# Patient Record
Sex: FEMALE
[De-identification: names, ages, dates, MRNs, and addresses within clinical notes are randomized per-mention and may not be internally consistent; named-entity substitution may affect disease eponyms.]

---

## 2017-07-10 ENCOUNTER — HOSPITAL ENCOUNTER (INPATIENT)
Dept: HOSPITAL 14 - H.ER | Age: 82
LOS: 5 days | Discharge: HOME HEALTH | DRG: 444 | End: 2017-07-15
Attending: INTERNAL MEDICINE | Admitting: INTERNAL MEDICINE
Payer: MEDICARE

## 2017-07-10 VITALS — BODY MASS INDEX: 25.8 KG/M2

## 2017-07-10 DIAGNOSIS — K82.8: ICD-10-CM

## 2017-07-10 DIAGNOSIS — K29.70: ICD-10-CM

## 2017-07-10 DIAGNOSIS — K59.00: ICD-10-CM

## 2017-07-10 DIAGNOSIS — M51.26: ICD-10-CM

## 2017-07-10 DIAGNOSIS — Z87.891: ICD-10-CM

## 2017-07-10 DIAGNOSIS — K62.5: ICD-10-CM

## 2017-07-10 DIAGNOSIS — I25.10: ICD-10-CM

## 2017-07-10 DIAGNOSIS — I25.2: ICD-10-CM

## 2017-07-10 DIAGNOSIS — I10: ICD-10-CM

## 2017-07-10 DIAGNOSIS — J44.1: ICD-10-CM

## 2017-07-10 DIAGNOSIS — D66: ICD-10-CM

## 2017-07-10 DIAGNOSIS — K80.64: Primary | ICD-10-CM

## 2017-07-10 DIAGNOSIS — Z88.0: ICD-10-CM

## 2017-07-10 DIAGNOSIS — Z87.442: ICD-10-CM

## 2017-07-10 DIAGNOSIS — Z14.01: ICD-10-CM

## 2017-07-10 LAB
ALBUMIN SERPL-MCNC: 4.5 G/DL (ref 3.5–5)
ALBUMIN/GLOB SERPL: 1.2 {RATIO} (ref 1–2.1)
ALT SERPL-CCNC: 33 U/L (ref 9–52)
APTT BLD: 31.5 SECONDS (ref 25.6–37.1)
AST SERPL-CCNC: 27 U/L (ref 14–36)
BACTERIA #/AREA URNS HPF: (no result) /[HPF]
BASOPHILS # BLD AUTO: 0.1 K/UL (ref 0–0.2)
BASOPHILS NFR BLD: 0.9 % (ref 0–2)
BILIRUB UR-MCNC: NEGATIVE MG/DL
BUN SERPL-MCNC: 23 MG/DL (ref 7–17)
CALCIUM SERPL-MCNC: 10.1 MG/DL (ref 8.4–10.2)
CAOX CRY #/AREA URNS HPF: (no result) /HPF
COLOR UR: (no result)
EOSINOPHIL # BLD AUTO: 0.3 K/UL (ref 0–0.7)
EOSINOPHIL NFR BLD: 3.3 % (ref 0–4)
ERYTHROCYTE [DISTWIDTH] IN BLOOD BY AUTOMATED COUNT: 14.1 % (ref 11.5–14.5)
GFR NON-AFRICAN AMERICAN: 39
GLUCOSE UR STRIP-MCNC: (no result) MG/DL
HGB BLD-MCNC: 15.6 G/DL (ref 12–16)
INR PPP: 1.1 (ref 0.9–1.2)
LEUKOCYTE ESTERASE UR-ACNC: (no result) LEU/UL
LYMPHOCYTES # BLD AUTO: 1.4 K/UL (ref 1–4.3)
LYMPHOCYTES NFR BLD AUTO: 14.9 % (ref 20–40)
MCH RBC QN AUTO: 28.6 PG (ref 27–31)
MCHC RBC AUTO-ENTMCNC: 33 G/DL (ref 33–37)
MCV RBC AUTO: 86.6 FL (ref 81–99)
MONOCYTES # BLD: 0.7 K/UL (ref 0–0.8)
MONOCYTES NFR BLD: 7.1 % (ref 0–10)
NEUTROPHILS # BLD: 7.1 K/UL (ref 1.8–7)
NEUTROPHILS NFR BLD AUTO: 73.8 % (ref 50–75)
NRBC BLD AUTO-RTO: 0.1 % (ref 0–0)
PH UR STRIP: 5 [PH] (ref 5–8)
PLATELET # BLD: 228 K/UL (ref 130–400)
PMV BLD AUTO: 8.6 FL (ref 7.2–11.7)
PROT UR STRIP-MCNC: 30 MG/DL
PROTHROMBIN TIME: 12.3 SECONDS (ref 9.8–13.1)
RBC # BLD AUTO: 5.46 MIL/UL (ref 3.8–5.2)
RBC # UR STRIP: NEGATIVE /UL
SP GR UR STRIP: 1.02 (ref 1–1.03)
SQUAMOUS EPITHIAL: 13 /HPF (ref 0–5)
URINE CLARITY: (no result)
URINE HYALINE CAST: >20 /HPF (ref 0–2)
URINE NITRATE: NEGATIVE
UROBILINOGEN UR-MCNC: 2 MG/DL (ref 0.2–1)
WBC # BLD AUTO: 9.7 K/UL (ref 4.8–10.8)

## 2017-07-10 NOTE — ED PDOC
- Laboratory Results


Result Diagrams: 


 07/12/17 07:20





 07/12/17 07:20





- ECG


O2 Sat by Pulse Oximetry: 98 (RA)





Disposition





- Clinical Impression


Clinical Impression: 


 Abdominal pain in female








- POA


Present On Arrival: None





- Disposition


Disposition: Transfer of Care


Disposition Time: 19:00


Condition: STABLE


Patient Signed Over To: Houston Gilbert


Handoff Comments: Pending CT.





Addendum


Addendum: 





07/10/17 17:43





Pt signed out by Dr. Ha pending CT.

## 2017-07-10 NOTE — CT
EXAM:

  CT Abdomen and Pelvis Without Intravenous Contrast



CLINICAL HISTORY:

  84 years old, female; Pain; Abdominal pain; Flank; Right upper quadrant 

(ruq); Additional info: Rlq pain, bloody stool; ? HX appendectomy 40+ yrs



TECHNIQUE:

  Axial computed tomography images of the abdomen and pelvis without 

intravenous contrast.  This CT exam was performed using one or more of the 

following dose reduction techniques:  automated exposure control, adjustment of 

the mA and/or kV according to patient size, and/or use of iterative 

reconstruction technique.

  Coronal and sagittal reformatted images were created and reviewed.



EXAM DATE/TIME:

  7/10/2017 5:40 PM



COMPARISON:

  CT - ABD   PELVIS PO CONTRAST ONLY 11/19/2016 3:18:32 PM



FINDINGS:

  Artifacts:  Motion artifact degrades image quality.

  Lower thorax:  The heart is mildly enlarged. There coronary calcifications. 

There is a small hiatal hernia. There is atelectasis and scarring at the lung 

bases.



 ABDOMEN:

  Liver: There is a small cyst in the liver.

  Gallbladder and bile ducts:  Gallbladder is distended. Common bile duct is 

dilated 12 mm in diameter.. There are multiple stones in the common duct.

  Pancreas:  Pancreas is atrophic.

  Spleen:  unremarkable

  Adrenals:  Right adrenal is nodular. Left adrenal is unremarkable.

  Kidneys and ureters:  There is right renal scarring. There are dystrophic 

calcifications in the right kidney. There is less extensive left renal 

scarring. There is left renal cyst.There is no pelvocaliectasis or 

ureterectasis.

  Stomach and bowel:  Stomach is almost empty. Rotation is normal. There is no 

obstruction. Terminal ileum is unremarkable.Appendix is not visualized. There 

is moderate stool in the colon. Streak and motion limited evaluation of the 

colon.

  Appendix:  See above.



 PELVIS:

  Bladder:  unremarkable

  Reproductive:  Uterus and adnexal structures are unremarkable.



 ABDOMEN and PELVIS:

  Intraperitoneal space:  There is no free air or free fluid.

  Bones/joints:  There are degenerative changes in the osseus structures.

  Soft tissues:  There is a very small fat containing umbilical hernia.

  Vasculature:  There are vascular calcifications.

  Lymph nodes:  There is no pathologic adenopathy.



IMPRESSION:  Distended gallbladder with dilated common duct and 

choledocholithiasis, similar finding seen on the prior study; no acute solid 

visceral abnormality; small hepatic cyst, unchanged; right renal scarring, 

complex right midpole renal mass seen on the prior study is no longer present, 

pelvocaliectasis and ureterectasis seen on the prior study has resolved; no 

acute bowel abnormality



Additional findings as described above.

## 2017-07-10 NOTE — ED PDOC
HPI: Abdomen


Time Seen by Provider: 07/10/17 15:48


Chief Complaint (Nursing): Abdominal Pain


Chief Complaint (Provider): Abdominal Pain


History Per: Patient


History/Exam Limitations: no limitations


Onset/Duration Of Symptoms: Days (x3 days)


Current Symptoms Are (Timing): Still Present


Additional Complaint(s): 





85 y/o female presents to the emergency department with a complaint of a right-

sided abdominal pain with bloody stools (had resolved since) x3 days. Pain is 

persistent and associated with generalized weakness and nausea. Describes pain 

as sharp. Denies radiation of pain elsewhere or any urinary symptoms. 





Past Medical History


Reviewed: Historical Data, Nursing Documentation, Vital Signs


Vital Signs: 


 Last Vital Signs











Temp  98.2 F   07/15/17 16:46


 


Pulse  80   07/15/17 16:46


 


Resp  17   07/15/17 16:46


 


BP  149/77   07/15/17 16:46


 


Pulse Ox  96   07/15/17 16:46














- Medical History


PMH: COPD, HTN, Kidney Stones, Pneumonia





- Surgical History


Other surgeries: Ovarian cyst surgery removed





- Family History


Family History: States: Unknown Family Hx





- Home Medications


Home Medications: 


 Ambulatory Orders











 Medication  Instructions  Recorded


 


ALPRAZolam [Xanax] 1 mg PO DAILY 07/10/17


 


Diltiazem HCl [Cardizem] 60 mg PO QID 07/10/17


 


Dorzolamide HCl 1 drop OS BID 07/10/17


 


Latanoprost 0.005% Opht [Xalatan 1 drop OU HS 07/10/17





Opht]  


 


Metoprolol Tartrate 100 mg PO BID 07/10/17


 


Timolol [Betimol] 1 drop OS BID 07/10/17


 


oxyCODONE/Acetaminophen [Percocet 1 tab PO PRN PRN 07/10/17





5/325 mg Tab]  


 


Ciprofloxacin [Cipro] 500 mg PO Q12 #10 tab 07/14/17














- Allergies


Allergies/Adverse Reactions: 


 Allergies











Allergy/AdvReac Type Severity Reaction Status Date / Time


 


Penicillins Allergy  RASH Verified 11/19/16 11:42














Review of Systems


ROS Statement: Except As Marked, All Systems Reviewed And Found Negative


Constitutional: Positive for: Weakness (Generalized)


Gastrointestinal: Positive for: Nausea, Abdominal Pain (Right-sided), 

Hematochezia (Had resolved since)


Genitourinary Female: Negative for: Dysuria, Frequency, Incontinence, Hematuria





Physical Exam





- Reviewed


Nursing Documentation Reviewed: Yes


Vital Signs Reviewed: Yes





- Physical Exam


Appears: Positive for: Non-toxic, No Acute Distress (Appears elderly and mildly 

dehydrated)


Head Exam: Positive for: ATRAUMATIC, NORMAL INSPECTION, NORMOCEPHALIC


Skin: Positive for: Normal Color, Warm, Dry


Neck: Positive for: Normal, Supple


Cardiovascular/Chest: Positive for: Regular Rate, Rhythm.  Negative for: Murmur


Respiratory: Positive for: Normal Breath Sounds.  Negative for: Accessory 

Muscle Use, Respiratory Distress


Gastrointestinal/Abdominal: Positive for: Soft, Tenderness (RLQ abdominal 

tenderness).  Negative for: Normal Exam


Extremity: Positive for: Normal ROM.  Negative for: Pedal Edema


Neurologic/Psych: Positive for: Alert, Oriented





- Laboratory Results


Result Diagrams: 


 07/15/17 13:00





 07/15/17 13:00





- ECG


O2 Sat by Pulse Oximetry: 98 (RA)


Pulse Ox Interpretation: Normal





Medical Decision Making


Medical Decision Making: 





Time: 1559


Initial impression: Abdominal pain


Initial plan:


--Abdominal pelvis PO & IV Contrast CT 


--Iohexol 50 ml PO 


--Urinalysis 


-- labwork





Time: 1706


--Sodium Chloride 1,000 ml  mls/hr





Results reviewed and discussed w PMD for admission.





Scribe Attestation:


Documented by Vania Rico, acting as a scribe for Adrian Ha MD.





Provider Scribe Attestation:


All medical record entries made by the Scribe were at my direction and 

personally dictated by me. I have reviewed the chart and agree that the record 

accurately reflects my personal performance of the history, physical exam, 

medical decision making, and the department course for this patient. I have 

also personally directed, reviewed, and agree with the discharge instructions 

and disposition.





Disposition





- Clinical Impression


Clinical Impression: 


 Abdominal pain in female, Cholelithiases, Rectal bleeding








- Patient ED Disposition


Is Patient to be Admitted: Yes


Counseled Patient/Family Regarding: Studies Performed, Diagnosis





- Disposition


Disposition Time: 17:30


Condition: STABLE





- Pt Status Changed To:


Hospital Disposition Of: Inpatient





- Admit Certification


Admit to Inpatient:: After my assessment, the patient will require 

hospitalization for at least two midnights.  This is because of the severity of 

symptoms shown, intensity of services needed, and/or the medical risk in this 

patient being treated as an outpatient.





- POA


Present On Arrival: None

## 2017-07-10 NOTE — ED PDOC
- Laboratory Results


Result Diagrams: 


 07/10/17 16:16





 07/10/17 16:16





- ECG


O2 Sat by Pulse Oximetry: 98 (RA)





- CT Scan/US


  ** CT abdomen and pelvis


Other Rad Studies (CT/US): Read By Radiologist, Radiology Report Reviewed (see 

MDM for interpretation)





Medical Decision Making


Medical Decision Makin:00


Patient is signed out to me by Melissa Vargas MD pending CT abdomen and pelvis 

with PO contrast, reevaluation, and final disposition.





20:13


CT abdomen and pelvis read and reviewed by radiologist.


FINDINGS:


Artifacts: Motion artifact degrades image quality.


Lower thorax: The heart is mildly enlarged. There coronary calcifications. 

There is a small hiatal


hernia. There is atelectasis and scarring at the lung bases.


ABDOMEN:


Liver: There is a small cyst in the liver.


Gallbladder and bile ducts: Gallbladder is distended. Common bile duct is 

dilated 12 mm in


diameter.. There are multiple stones in the common duct.


Pancreas: Pancreas is atrophic.


Spleen: unremarkable


Adrenals: Right adrenal is nodular. Left adrenal is unremarkable.


Kidneys and ureters: There is right renal scarring. There are dystrophic 

calcifications in the right


kidney. There is less extensive left renal scarring. There is left renal 

cyst.There is no pelvocaliectasis


or ureterectasis.


Stomach and bowel: Stomach is almost empty. Rotation is normal. There is no 

obstruction. Terminal


ileum is unremarkable.Appendix is not visualized. There is moderate stool in 

the colon. Streak and


motion limited evaluation of the colon.


Appendix: See above.


PELVIS:


Bladder: unremarkable


Reproductive: Uterus and adnexal structures are unremarkable.


ABDOMEN and PELVIS:


Intraperitoneal space: There is no free air or free fluid.


Bones/joints: There are degenerative changes in the osseus structures.


Soft tissues: There is a very small fat containing umbilical hernia.


Vasculature: There are vascular calcifications.


Lymph nodes: There is no pathologic adenopathy.


IMPRESSION: Distended gallbladder with dilated common duct and 

choledocholithiasis, similar


finding seen on the prior study; no acute solid visceral abnormality; small 

hepatic cyst, unchanged;


right renal scarring, complex right midpole renal mass seen on the prior study 

is no longer present,


pelvocaliectasis and ureterectasis seen on the prior study has resolved; no 

acute bowel abnormality





20:45


Upon provider reevaluation, patient has persistent pain. Given CT findings of 

multiple gallstones in the common bile duct, patient will be admitted for 

further treatment and consult with gastroenterology specialist. 


Discussed case with . Patient will be admitted to med/surg under 

.





--------------------------------------------------------------------------------

----------------- 


Scribe Attestation:


Documented by Alicia Velasco, acting as a scribe for Houston Gilbert MD.


 


Provider Scribe Attestation:


All medical record entries made by the Scribe were at my direction and 

personally dictated by me. I have reviewed the chart and agree that the record 

accurately reflects my personal performance of the history, physical exam, 

medical decision making, and the department course for this patient. I have 

also personally directed, reviewed, and agree with the discharge instructions 

and disposition.








Disposition


Counseled Patient/Family Regarding: Studies Performed, Diagnosis





- Clinical Impression


Clinical Impression: 


 Choledocholithiasis, Abdominal pain in female








- POA


Present On Arrival: None





- Disposition


Disposition: Admitted as In-Patient


Disposition Time: 20:45


Condition: STABLE

## 2017-07-11 LAB
BASOPHILS # BLD AUTO: 0.1 K/UL (ref 0–0.2)
BASOPHILS NFR BLD: 1 % (ref 0–2)
BUN SERPL-MCNC: 16 MG/DL (ref 7–17)
CALCIUM SERPL-MCNC: 9.1 MG/DL (ref 8.4–10.2)
EOSINOPHIL # BLD AUTO: 0.4 K/UL (ref 0–0.7)
EOSINOPHIL NFR BLD: 4.3 % (ref 0–4)
ERYTHROCYTE [DISTWIDTH] IN BLOOD BY AUTOMATED COUNT: 13.9 % (ref 11.5–14.5)
GFR NON-AFRICAN AMERICAN: > 60
HGB BLD-MCNC: 14.5 G/DL (ref 12–16)
LYMPHOCYTES # BLD AUTO: 1.7 K/UL (ref 1–4.3)
LYMPHOCYTES NFR BLD AUTO: 21.4 % (ref 20–40)
MCH RBC QN AUTO: 28.3 PG (ref 27–31)
MCHC RBC AUTO-ENTMCNC: 32.8 G/DL (ref 33–37)
MCV RBC AUTO: 86.4 FL (ref 81–99)
MONOCYTES # BLD: 0.6 K/UL (ref 0–0.8)
MONOCYTES NFR BLD: 7.5 % (ref 0–10)
NEUTROPHILS # BLD: 5.4 K/UL (ref 1.8–7)
NEUTROPHILS NFR BLD AUTO: 65.8 % (ref 50–75)
NRBC BLD AUTO-RTO: 0 % (ref 0–0)
PLATELET # BLD: 189 K/UL (ref 130–400)
PMV BLD AUTO: 9 FL (ref 7.2–11.7)
RBC # BLD AUTO: 5.13 MIL/UL (ref 3.8–5.2)
WBC # BLD AUTO: 8.2 K/UL (ref 4.8–10.8)

## 2017-07-11 RX ADMIN — DORZOLAMIDE HYDROCHLORIDE SCH UNITS: 20 SOLUTION OPHTHALMIC at 17:27

## 2017-07-11 RX ADMIN — LATANOPROST SCH DROP: 50 SOLUTION OPHTHALMIC at 22:59

## 2017-07-11 RX ADMIN — TIMOLOL MALEATE SCH DROP: 2.5 SOLUTION OPHTHALMIC at 17:27

## 2017-07-11 RX ADMIN — TIMOLOL MALEATE SCH DROP: 2.5 SOLUTION OPHTHALMIC at 12:54

## 2017-07-11 RX ADMIN — DORZOLAMIDE HYDROCHLORIDE SCH UNITS: 20 SOLUTION OPHTHALMIC at 11:16

## 2017-07-11 NOTE — CP.PCM.CON
History of Present Illness





- History of Present Illness


History of Present Illness: 





Surgery consult for Dr. Gill





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 

MI presents with R side abd pain and rectal bleeding. 


She reports that pain started 3 days ago and progressively gotten worse. Pain 

is located RLQ and RUQ radiating to epigastric area and L side. This is the 

second time having these episodes. She also had an episode of rectal bleeding 

which resolved. Pain is not associated with food. C/O weakness and nausea. 

Denies F/C/V/D/CP/SOB. 


For her hemophilia traits, she is normally asymptomatic, but after a surgery 

she has had excessive bleeding, and has needed factor VIII transfusions. Pt had 

Colonoscopy 10 yrs ago and was normal. 


Pt had a CT scan which showed several gall stones  with inflammatory changes in 

the GB . WBC and LFT are wnl. She has not had a test to check the factor VIII 

level in 12 yrs. She has no evidence of bleeding from any site other than the 

rectum for 2 days.





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


PSH: dental extraction, L breast cyst removal, 





Review of Systems





- Review of Systems


Review of Systems: 





See HPI 





Past Patient History





- Past Medical History & Family History


Past Medical History?: Yes





- Past Social History


Smoking Status: Former Smoker





- CARDIAC


Hx Cardiac Disorders: Yes


Hx Heart Attack: Yes (1986)


Hx Hypertension: Yes





- PULMONARY


Hx Chronic Obstructive Pulmonary Disease (COPD): Yes


Hx Pneumonia: Yes





- NEUROLOGICAL


Hx Neurological Disorder: Yes


Hx Dizziness: Yes (LIGHTHEADEDNESS)





- HEENT


Hx Cataracts: Yes





- RENAL


Hx Kidney Stones: Yes





- ENDOCRINE/METABOLIC


Hx Endocrine Disorders: No





- HEMATOLOGICAL/ONCOLOGICAL


Hx Hemophilia: Yes (Carrier)





- MUSCULOSKELETAL/RHEUMATOLOGICAL


Hx Falls: No





- GASTROINTESTINAL


Hx Gall Bladder Disease: Yes


Hx Hemorrhoids: Yes


Other/Comment: Rectal bleeding





- GENITOURINARY/GYNECOLOGICAL


Hx Hematuria: Yes





- PSYCHIATRIC


Hx Substance Use: No





- SURGICAL HISTORY


Hx Cataract Extraction: Yes


Other/Comment: Ovarian cyst





- ANESTHESIA


Hx Anesthesia: Yes


Hx Anesthesia Reactions: No





Meds


Allergies/Adverse Reactions: 


 Allergies











Allergy/AdvReac Type Severity Reaction Status Date / Time


 


Penicillins Allergy  RASH Verified 11/19/16 11:42














- Medications


Medications: 


 Current Medications





Alprazolam (Xanax)  0.5 mg PO TID PRN


   PRN Reason: Anxiety


Diltiazem HCl (Cardizem)  60 mg PO QID [facility]


   Last Admin: 07/11/17 12:52 Dose:  60 mg


Dorzolamide HCl (Trusopt)  1 drop OS BID [facility]


   Last Admin: 07/11/17 11:16 Dose:  1 units


Enoxaparin Sodium (Lovenox)  40 mg SC DAILY [facility]


   PRN Reason: Protocol


   Last Admin: 07/11/17 11:16 Dose:  40 mg


Latanoprost (Xalatan Opht)  1 drop OU HS [facility]


Timolol Maleate (Timoptic 0.25% Ophth Soln)  1 drop OS BID [facility]


   Last Admin: 07/11/17 12:54 Dose:  1 drop











Physical Exam





- Constitutional


Appears: No Acute Distress





- Head Exam


Head Exam: ATRAUMATIC, NORMAL INSPECTION, NORMOCEPHALIC





- Eye Exam


Eye Exam: EOMI, Normal appearance, PERRL


Pupil Exam: NORMAL ACCOMODATION, PERRL





- ENT Exam


ENT Exam: Mucous Membranes Moist, Normal Exam





- Neck Exam


Neck exam: Positive for: Normal Inspection





- Respiratory Exam


Respiratory Exam: Clear to Auscultation Bilateral, NORMAL BREATHING PATTERN





- Cardiovascular Exam


Cardiovascular Exam: REGULAR RHYTHM





- GI/Abdominal Exam


GI & Abdominal Exam: Normal Bowel Sounds, Soft, Tenderness.  absent: Distended, 

Firm, Guarding, Hernia


Additional comments: 





RUQ RLQ TTP 





- Rectal Exam


Rectal Exam: Hemorrhoids.  absent: Black Stool, Bloody Stool


Additional comments: 





Normal sphincter tone. Brown stool in rectal vault. Small external hemorrhoid. 

NT 





-  Exam


 Exam: NORMAL INSPECTION





- Back Exam


Back exam: NORMAL INSPECTION





- Neurological Exam


Neurological exam: Alert, CN II-XII Intact, Normal Gait, Oriented x3, Reflexes 

Normal





- Psychiatric Exam


Psychiatric exam: Normal Affect, Normal Mood





- Skin


Skin Exam: Dry, Intact, Normal Color, Warm





Results





- Vital Signs


Recent Vital Signs: 


 Last Vital Signs











Temp  97.5 F L  07/11/17 12:04


 


Pulse  92 H  07/11/17 12:52


 


Resp  18   07/11/17 12:04


 


BP  137/72   07/11/17 12:52


 


Pulse Ox  99   07/11/17 12:04














- Labs


Result Diagrams: 


 07/11/17 05:00





 07/11/17 05:00


Labs: 


 Laboratory Results - last 24 hr











  07/10/17 07/11/17 07/11/17





  21:05 05:00 05:00


 


WBC   8.2 


 


RBC   5.13 


 


Hgb   14.5 


 


Hct   44.3 


 


MCV   86.4 


 


MCH   28.3 


 


MCHC   32.8 L 


 


RDW   13.9 


 


Plt Count   189 


 


MPV   9.0 


 


Neut % (Auto)   65.8 


 


Lymph % (Auto)   21.4 


 


Mono % (Auto)   7.5 


 


Eos % (Auto)   4.3 H 


 


Baso % (Auto)   1.0 


 


Neut #   5.4 


 


Lymph #   1.7 


 


Mono #   0.6 


 


Eos #   0.4 


 


Baso #   0.1 


 


Sodium    143


 


Potassium    3.8


 


Chloride    110 H


 


Carbon Dioxide    25


 


Anion Gap    12


 


BUN    16


 


Creatinine    0.8


 


Est GFR ( Amer)    > 60


 


Est GFR (Non-Af Amer)    > 60


 


Random Glucose    88


 


Calcium    9.1


 


Lipase  158  














Assessment & Plan





- Assessment and Plan (Free Text)


Assessment: 





Choledocolithiasis 


CT shows gallstones and multiple stones in the CBD. CBD 12mm


WBC , LFT wnl





-GI on board: Possible ERCP 


-trend labs


-Hemeonc on board for hemophilia carrier 


-We will follow closely. 


-May need eventual cholecystectomy : will need to be given factor VIII before 

and after the surgery,She has not had a test to check the factor VIII level in 

12 yrs. 


-NPO for possible ERCP 





DW Dr. Gill

## 2017-07-11 NOTE — CP.PCM.HP
History of Present Illness





- History of Present Illness


History of Present Illness: 





85 y/o w/f admitted with abdominal pain


found to have cholelithiasis/distended gallbladder/choldocholithiasis





The patient also reports that the day prior to admission in the days leading up 

to admission she has been having rectal bleeding.


Have advised her that she most likely needs a colonoscopy


Patient should have a cholecystectomy


At this point she is undecided about either one




















PMH:


COPD


Hypertension


CAD


Old MI 1986


Carrier for Hemophilia


Nephrolithiasis


Gastritis


LS  Multple Herniated discs


Anxiety





Labs: Are basically within normal limits except for a creatinine of 1.3











Present on Admission





- Present on Admission


Any Indicators Present on Admission: No





Review of Systems





- Gastrointestinal


Gastrointestinal: Hematochezia





Past Patient History





- Past Medical History & Family History


Past Medical History?: Yes





- Past Social History


Smoking Status: Former Smoker





- CARDIAC


Hx Cardiac Disorders: Yes


Hx Heart Attack: Yes (1986)


Hx Hypertension: Yes





- PULMONARY


Hx Chronic Obstructive Pulmonary Disease (COPD): Yes


Hx Pneumonia: Yes





- NEUROLOGICAL


Hx Neurological Disorder: Yes


Hx Dizziness: Yes (LIGHTHEADEDNESS)





- HEENT


Hx Cataracts: Yes





- RENAL


Hx Kidney Stones: Yes





- ENDOCRINE/METABOLIC


Hx Endocrine Disorders: No





- HEMATOLOGICAL/ONCOLOGICAL


Hx Hemophilia: Yes (Carrier)





- MUSCULOSKELETAL/RHEUMATOLOGICAL


Hx Falls: No





- GASTROINTESTINAL


Hx Gall Bladder Disease: Yes


Hx Hemorrhoids: Yes


Other/Comment: Rectal bleeding





- GENITOURINARY/GYNECOLOGICAL


Hx Hematuria: Yes





- PSYCHIATRIC


Hx Substance Use: No





- SURGICAL HISTORY


Hx Cataract Extraction: Yes


Other/Comment: Ovarian cyst





- ANESTHESIA


Hx Anesthesia: Yes


Hx Anesthesia Reactions: No





Meds


Allergies/Adverse Reactions: 


 Allergies











Allergy/AdvReac Type Severity Reaction Status Date / Time


 


Penicillins Allergy  RASH Verified 11/19/16 11:42














Physical Exam





- Constitutional


Appears: Well





- Head Exam


Head Exam: NORMAL INSPECTION





- Eye Exam


Eye Exam: Normal appearance





- ENT Exam


ENT Exam: Normal Exam





- Neck Exam


Neck exam: Positive for: Normal Inspection





- Respiratory Exam


Respiratory Exam: NORMAL BREATHING PATTERN





- Cardiovascular Exam


Cardiovascular Exam: REGULAR RHYTHM





- GI/Abdominal Exam


GI & Abdominal Exam: Normal Bowel Sounds





Results





- Vital Signs


Recent Vital Signs: 





 Last Vital Signs











Temp  97.5 F L  07/11/17 04:11


 


Pulse  80   07/11/17 04:11


 


Resp  16   07/11/17 04:11


 


BP  155/82 H  07/11/17 04:11


 


Pulse Ox  98   07/11/17 04:11














- Labs


Result Diagrams: 


 07/11/17 05:00





 07/11/17 05:00


Labs: 





 Laboratory Results - last 24 hr











  07/10/17





  21:05


 


Lipase  158














Assessment & Plan


(1) Choledocholithiasis


Status: Acute   





(2) Cholelithiasis


Status: Acute   Onset Date: 11/19/16   





(3) Rectal bleeding


Status: Acute   





(4) Essential (primary) hypertension


Status: Acute   





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 

exposure to volcanic air pollution


Status: Acute   





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


Status: Acute   





(7) Herniated lumbar intervertebral disc


Status: Acute

## 2017-07-12 LAB
ALBUMIN SERPL-MCNC: 3.7 G/DL (ref 3.5–5)
ALBUMIN SERPL-MCNC: 3.8 G/DL (ref 3.5–5)
ALBUMIN/GLOB SERPL: 1.3 {RATIO} (ref 1–2.1)
ALBUMIN/GLOB SERPL: 1.3 {RATIO} (ref 1–2.1)
ALT SERPL-CCNC: 25 U/L (ref 9–52)
ALT SERPL-CCNC: 30 U/L (ref 9–52)
AST SERPL-CCNC: 20 U/L (ref 14–36)
AST SERPL-CCNC: 22 U/L (ref 14–36)
BILIRUBIN,DIRECT: 0.4 MG/ML (ref 0–0.4)
BUN SERPL-MCNC: 19 MG/DL (ref 7–17)
CALCIUM SERPL-MCNC: 9 MG/DL (ref 8.4–10.2)
ERYTHROCYTE [DISTWIDTH] IN BLOOD BY AUTOMATED COUNT: 13.8 % (ref 11.5–14.5)
GFR NON-AFRICAN AMERICAN: > 60
HGB BLD-MCNC: 14.5 G/DL (ref 12–16)
MCH RBC QN AUTO: 28.4 PG (ref 27–31)
MCHC RBC AUTO-ENTMCNC: 33.2 G/DL (ref 33–37)
MCV RBC AUTO: 85.7 FL (ref 81–99)
PLATELET # BLD: 201 K/UL (ref 130–400)
RBC # BLD AUTO: 5.11 MIL/UL (ref 3.8–5.2)
WBC # BLD AUTO: 7.4 K/UL (ref 4.8–10.8)

## 2017-07-12 RX ADMIN — TIMOLOL MALEATE SCH DROP: 2.5 SOLUTION OPHTHALMIC at 09:52

## 2017-07-12 RX ADMIN — DORZOLAMIDE HYDROCHLORIDE SCH DROP: 20 SOLUTION OPHTHALMIC at 17:00

## 2017-07-12 RX ADMIN — LATANOPROST SCH DROP: 50 SOLUTION OPHTHALMIC at 21:42

## 2017-07-12 RX ADMIN — DORZOLAMIDE HYDROCHLORIDE SCH DROP: 20 SOLUTION OPHTHALMIC at 09:53

## 2017-07-12 RX ADMIN — TIMOLOL MALEATE SCH DROP: 2.5 SOLUTION OPHTHALMIC at 17:39

## 2017-07-12 NOTE — CP.PCM.PN
Subjective





- Date & Time of Evaluation


Date of Evaluation: 07/12/17


Time of Evaluation: 11:00





- Subjective


Subjective: 





patient is resting comfortably in bed.


Denies any pains in her abdomen nauseousness or vomiting


Patient is scheduled for ERCP later this afternoon.


Discussed the possibility of a colonoscopy with DR Nelson





Objective





- Vital Signs/Intake and Output


Vital Signs (last 24 hours): 


 











Temp Pulse Resp BP Pulse Ox


 


 98.6 F   90   20   155/90 H  95 


 


 07/12/17 09:00  07/12/17 09:00  07/12/17 09:00  07/12/17 09:00  07/12/17 09:00











- Medications


Medications: 


 Current Medications





Alprazolam (Xanax)  0.5 mg PO TID PRN


   PRN Reason: Anxiety


Diltiazem HCl (Cardizem)  60 mg PO QID [facility]


   Last Admin: 07/12/17 09:52 Dose:  Not Given


Dorzolamide HCl (Trusopt)  1 drop OS BID [facility]


   Last Admin: 07/12/17 09:53 Dose:  1 drop


Enoxaparin Sodium (Lovenox)  40 mg SC DAILY [facility]


   PRN Reason: Protocol


   Last Admin: 07/11/17 11:16 Dose:  40 mg


Latanoprost (Xalatan Opht)  1 drop OU HS [facility]


   Last Admin: 07/11/17 22:59 Dose:  1 drop


Timolol Maleate (Timoptic 0.25% Ophth Soln)  1 drop OS BID [facility]


   Last Admin: 07/12/17 09:52 Dose:  1 drop











- Labs


Labs: 


 





 07/12/17 07:20 





 07/12/17 07:20 





 











PT  12.3 Seconds (9.8-13.1)   07/10/17  16:16    


 


INR  1.1  (0.9-1.2)   07/10/17  16:16    


 


APTT  31.5 Seconds (25.6-37.1)   07/10/17  16:16    














Assessment and Plan


(1) Choledocholithiasis


Status: Acute   





(2) Cholelithiasis


Status: Acute   





(3) Rectal bleeding


Status: Acute   





(4) Essential (primary) hypertension


Status: Acute   





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 

exposure to volcanic air pollution


Status: Acute   





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


Status: Acute   





(7) Herniated lumbar intervertebral disc


Status: Acute

## 2017-07-12 NOTE — CP.PCM.PN
Subjective





- Date & Time of Evaluation


Date of Evaluation: 07/12/17


Time of Evaluation: 13:20





- Subjective


Subjective: 





no pain, feels better after laxative





Objective





- Vital Signs/Intake and Output


Vital Signs (last 24 hours): 


 











Temp Pulse Resp BP Pulse Ox


 


 98.6 F   90   20   155/90 H  95 


 


 07/12/17 09:00  07/12/17 09:00  07/12/17 09:00  07/12/17 09:00  07/12/17 09:00











- Medications


Medications: 


 Current Medications





Alprazolam (Xanax)  0.5 mg PO TID PRN


   PRN Reason: Anxiety


Diltiazem HCl (Cardizem)  60 mg PO QID [facility]


   Last Admin: 07/12/17 09:52 Dose:  Not Given


Dorzolamide HCl (Trusopt)  1 drop OS BID [facility]


   Last Admin: 07/12/17 09:53 Dose:  1 drop


Enoxaparin Sodium (Lovenox)  40 mg SC DAILY [facility]


   PRN Reason: Protocol


   Last Admin: 07/11/17 11:16 Dose:  40 mg


Latanoprost (Xalatan Opht)  1 drop OU HS [facility]


   Last Admin: 07/11/17 22:59 Dose:  1 drop


Timolol Maleate (Timoptic 0.25% Ophth Soln)  1 drop OS BID [facility]


   Last Admin: 07/12/17 09:52 Dose:  1 drop











- Labs


Labs: 


 





 07/12/17 07:20 





 07/12/17 07:20 





 











PT  12.3 Seconds (9.8-13.1)   07/10/17  16:16    


 


INR  1.1  (0.9-1.2)   07/10/17  16:16    


 


APTT  31.5 Seconds (25.6-37.1)   07/10/17  16:16    














- GI/Abdominal Exam


GI & Abdominal Exam: Soft, Normal Bowel Sounds





Assessment and Plan





- Assessment and Plan (Free Text)


Assessment: 





83 yo female with constipation and choledocholithiasis





will d/w team regarding plan


mrcp for now

## 2017-07-12 NOTE — MRI
PROCEDURE:  Magnetic Resonance Cholangiopancreatography



HISTORY:

CBD stone 



COMPARISON:

Comparison is made to the previous CT dated 07/10/2017.



TECHNIQUE:

Multiplanar, multisequence MR images of the abdomen were obtained, 

including heavily T2 weighted MRCP images of the biliary system. 

Rotating maximum intensity projection images of the biliary system 

were generated.



FINDINGS:



MRCP:

There are multiple round filling defects in the common hepatic and 

common biliary duct consistent with choledocholithiasis. Multiple 

choledocholithiasis are seen from the proximal portion of the hepatic 

up to the distal CBD adjacent to the sphincter. The CBD is mildly 

dilated. Mild intrahepatic biliary ductal dilatation is also noted. 



LIVER:

No evidence of suspicious mass in the liver in this noncontrast study.



GALLBLADDER:

The gall bladder is a mildly to moderately distended contains small 

amount of sludge and possible small gallstones.



SPLEEN:

Unremarkable.



PANCREAS:

The pancreas is small in size. No evidence of main pancreatic ductal 

dilatation.  No MRI evidence of mass lesion or acute pathology in the 

pancreas



ADRENALS:

Stable mildly hyperintense T2 signal 1.2 centimeter nodule at the 

right adrenal gland since the previous CT dated 2/2009. Findings 

suggestive of benign adenoma.



KIDNEYS:

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 No 

evidence of hydronephrosis.  Multiple cystic lesions in both kidneys 

are again seen.



AORTA:

No aneurysm.



ASCITES:

None.



OTHER FINDINGS:

None.



IMPRESSION:

Multiple choledocholithiasis seen in the common hepatic and common 

biliary ducts extending from the proximal portion of the common 

hepatic/ main hepatic duct to the distal CBD and associated with mild 

dilatation of the intrahepatic and extrahepatic biliary tree.



Mildly to moderately distended gallbladder contains small amount of 

sludge and small gallstones without evidence of acute cholecystitis.

## 2017-07-12 NOTE — CP.PCM.PN
Subjective





- Date & Time of Evaluation


Date of Evaluation: 07/12/17


Time of Evaluation: 10:06





- Subjective


Subjective: 





Surgery: Dr. Park





Pt seen and examined. Resting comfortably in bed. Pain improved. No F/C. No N/V.





Objective





- Vital Signs/Intake and Output


Vital Signs (last 24 hours): 


 











Temp Pulse Resp BP Pulse Ox


 


 98.6 F   90   20   155/90 H  95 


 


 07/12/17 09:00  07/12/17 09:00  07/12/17 09:00  07/12/17 09:00  07/12/17 09:00











- Medications


Medications: 


 Current Medications





Alprazolam (Xanax)  0.5 mg PO TID PRN


   PRN Reason: Anxiety


Diltiazem HCl (Cardizem)  [facility]ID [facility]


   Last Admin: 07/12/17 09:52 Dose:  Not Given


Dorzolamide HCl (Trusopt)  1 drop OS [facility]ID [facility]


   Last Admin: 07/12/17 09:53 Dose:  1 drop


Enoxaparin Sodium (Lovenox)  40 mg SC [facility]ILY [facility]


   PRN Reason: Protocol


   Last Admin: 07/11/17 11:16 Dose:  40 mg


Latanoprost (Xalatan Opht)  1 drop OU HS [facility]


   Last Admin: 07/11/17 22:59 Dose:  1 drop


Timolol Maleate (Timoptic 0.25% Ophth Soln)  1 drop OS BID [facility]


   Last Admin: 07/12/17 09:52 Dose:  1 drop











- Labs


Labs: 


 





 07/12/17 07:20 





 07/12/17 07:20 





 











PT  12.3 Seconds (9.8-13.1)   07/10/17  16:16    


 


INR  1.1  (0.9-1.2)   07/10/17  16:16    


 


APTT  31.5 Seconds (25.6-37.1)   07/10/17  16:16    














- Constitutional


Appears: Non-toxic, No Acute Distress





- Head Exam


Head Exam: ATRAUMATIC, NORMOCEPHALIC





- Eye Exam


Eye Exam: EOMI.  absent: Scleral icterus





- ENT Exam


ENT Exam: Mucous Membranes Moist





- Neck Exam


Neck Exam: Full ROM





- Respiratory Exam


Respiratory Exam: NORMAL BREATHING PATTERN.  absent: Accessory Muscle Use, 

Respiratory Distress





- GI/Abdominal Exam


GI & Abdominal Exam: Soft.  absent: Distended, Firm, Guarding, Rigid, Tenderness

, Rebound





- Neurological Exam


Neurological Exam: Alert, Awake, Oriented x3





Assessment and Plan





- Assessment and Plan (Free Text)


Assessment: 





84F w. choledocholithiasis


-symptoms improved, LFTs/CBC WNL


-F/U GI recommendations, will likely need ERCP


-c/w current medical management


-will continue to follow


-d/w attending





Julianaitis PGY3

## 2017-07-13 RX ADMIN — TIMOLOL MALEATE SCH DROP: 2.5 SOLUTION OPHTHALMIC at 08:59

## 2017-07-13 RX ADMIN — DORZOLAMIDE HYDROCHLORIDE SCH DROP: 20 SOLUTION OPHTHALMIC at 16:54

## 2017-07-13 RX ADMIN — TIMOLOL MALEATE SCH DROP: 2.5 SOLUTION OPHTHALMIC at 16:54

## 2017-07-13 RX ADMIN — DORZOLAMIDE HYDROCHLORIDE SCH DROP: 20 SOLUTION OPHTHALMIC at 08:59

## 2017-07-13 RX ADMIN — LATANOPROST SCH DROP: 50 SOLUTION OPHTHALMIC at 21:45

## 2017-07-13 NOTE — CP.PCM.PN
Subjective





- Date & Time of Evaluation


Date of Evaluation: 07/13/17


Time of Evaluation: 08:16





- Subjective


Subjective: 





Pt has no more c/o abdominal pain. A MRCP done showed choledocjholethiasis. No 

mass If pt needs any procedure I will have to order factor VIII a day prior to 

the procedure





Objective





- Vital Signs/Intake and Output


Vital Signs (last 24 hours): 


 











Temp Pulse Resp BP Pulse Ox


 


 97.7 F   80   18   146/87   97 


 


 07/13/17 07:51  07/13/17 07:51  07/13/17 07:51  07/13/17 07:51  07/13/17 07:51











- Medications


Medications: 


 Current Medications





Alprazolam (Xanax)  0.5 mg PO TID PRN


   PRN Reason: Anxiety


   Last Admin: 07/12/17 21:42 Dose:  0.5 mg


Diltiazem HCl (Cardizem)  60 mg PO QID [facility]


   Last Admin: 07/12/17 21:42 Dose:  60 mg


Dorzolamide HCl (Trusopt)  1 drop OS BID [facility]


   Last Admin: 07/12/17 17:00 Dose:  1 drop


Enoxaparin Sodium (Lovenox)  40 mg SC DAILY [facility]


   PRN Reason: Protocol


   Last Admin: 07/11/17 11:16 Dose:  40 mg


Latanoprost (Xalatan Opht)  1 drop OU HS [facility]


   Last Admin: 07/12/17 21:42 Dose:  1 drop


Timolol Maleate (Timoptic 0.25% Ophth Soln)  1 drop OS BID [facility]


   Last Admin: 07/12/17 17:39 Dose:  1 drop











- Labs


Labs: 


 





 07/12/17 07:20 





 07/12/17 07:20 





 











PT  12.3 Seconds (9.8-13.1)   07/10/17  16:16    


 


INR  1.1  (0.9-1.2)   07/10/17  16:16    


 


APTT  31.5 Seconds (25.6-37.1)   07/10/17  16:16

## 2017-07-13 NOTE — CP.PCM.PN
<ArturGisell - Last Filed: 07/13/17 09:59>





Subjective





- Date & Time of Evaluation


Date of Evaluation: 07/13/17


Time of Evaluation: 10:00





- Subjective


Subjective: 





Surgery for Dr. Gill





Pt s&eBalaji COATS. Pt tolerating diet. pain improved. ambulating. Voiding. BM. Pt 

reluctant to undergo procedures. 





Objective





- Vital Signs/Intake and Output


Vital Signs (last 24 hours): 


 











Temp Pulse Resp BP Pulse Ox


 


 97.7 F   80   18   146/87   97 


 


 07/13/17 07:51  07/13/17 08:58  07/13/17 07:51  07/13/17 08:58  07/13/17 07:51











- Medications


Medications: 


 Current Medications





Alprazolam (Xanax)  0.5 mg PO TID PRN


   PRN Reason: Anxiety


   Last Admin: 07/12/17 21:42 Dose:  0.5 mg


Diltiazem HCl (Cardizem)  60 mg PO QID [facility]


   Last Admin: 07/13/17 08:58 Dose:  60 mg


Dorzolamide HCl (Trusopt)  1 drop OS BID [facility]


   Last Admin: 07/13/17 08:59 Dose:  1 drop


Enoxaparin Sodium (Lovenox)  40 mg SC DAILY [facility]


   PRN Reason: Protocol


   Last Admin: 07/11/17 11:16 Dose:  40 mg


Latanoprost (Xalatan Opht)  1 drop OU HS [facility]


   Last Admin: 07/12/17 21:42 Dose:  1 drop


Timolol Maleate (Timoptic 0.25% Ophth Soln)  1 drop OS BID [facility]


   Last Admin: 07/13/17 08:59 Dose:  1 drop











- Labs


Labs: 


 





 07/12/17 07:20 





 07/12/17 07:20 





 











PT  12.3 Seconds (9.8-13.1)   07/10/17  16:16    


 


INR  1.1  (0.9-1.2)   07/10/17  16:16    


 


APTT  31.5 Seconds (25.6-37.1)   07/10/17  16:16    














- Constitutional


Appears: No Acute Distress





- Head Exam


Head Exam: ATRAUMATIC, NORMAL INSPECTION, NORMOCEPHALIC





- Eye Exam


Eye Exam: EOMI, Normal appearance, PERRL


Pupil Exam: NORMAL ACCOMODATION, PERRL





- ENT Exam


ENT Exam: Mucous Membranes Moist, Normal Exam





- Neck Exam


Neck Exam: Full ROM, Normal Inspection.  absent: Lymphadenopathy





- Respiratory Exam


Respiratory Exam: Clear to Ausculation Bilateral, NORMAL BREATHING PATTERN





- Cardiovascular Exam


Cardiovascular Exam: REGULAR RHYTHM, +S1, +S2.  absent: Murmur





- GI/Abdominal Exam


GI & Abdominal Exam: Soft, Tenderness, Normal Bowel Sounds.  absent: Distended, 

Firm, Guarding, Rigid


Additional comments: 





RUQ TTP 





-  Exam


 Exam: NORMAL INSPECTION





- Extremities Exam


Extremities Exam: Full ROM, Normal Capillary Refill, Normal Inspection.  absent

: Joint Swelling, Pedal Edema





- Back Exam


Back Exam: NORMAL INSPECTION





- Neurological Exam


Neurological Exam: Alert, Awake, CN II-XII Intact, Normal Gait, Oriented x3





- Psychiatric Exam


Psychiatric exam: Normal Affect, Normal Mood





- Skin


Skin Exam: Dry, Intact, Normal Color, Warm





Assessment and Plan





- Assessment and Plan (Free Text)


Assessment: 





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


MRCP/CT : choledocolithiasis 


LFT : wnl 


Pt and family reluctant to undergo procedures for bleeding risks and age





-Diet per GI and primary 


-symptoms improved, LFTs/CBC WNL


-F/U GI recommendations


-c/w current medical management


-will continue to follow


-d/w attending





<Matthew Gill - Last Filed: 07/13/17 10:50>





Subjective





- Subjective


Subjective: 





Patient was seen and examined at the bedside.   Agree with resident's note 

above.





Objective





- Vital Signs/Intake and Output


Vital Signs (last 24 hours): 


 











Temp Pulse Resp BP Pulse Ox


 


 97.7 F   80   18   146/87   97 


 


 07/13/17 07:51  07/13/17 08:58  07/13/17 07:51  07/13/17 08:58  07/13/17 07:51











- Medications


Medications: 


 Current Medications





Alprazolam (Xanax)  0.5 mg PO TID PRN


   PRN Reason: Anxiety


   Last Admin: 07/12/17 21:42 Dose:  0.5 mg


Diltiazem HCl (Cardizem)  60 mg PO QID [facility]


   Last Admin: 07/13/17 08:58 Dose:  60 mg


Dorzolamide HCl (Trusopt)  1 drop OS BID [facility]


   Last Admin: 07/13/17 08:59 Dose:  1 drop


Enoxaparin Sodium (Lovenox)  40 mg SC DAILY [facility]


   PRN Reason: Protocol


   Last Admin: 07/11/17 11:16 Dose:  40 mg


Latanoprost (Xalatan Opht)  1 drop OU HS [facility]


   Last Admin: 07/12/17 21:42 Dose:  1 drop


Timolol Maleate (Timoptic 0.25% Ophth Soln)  1 drop OS BID [facility]


   Last Admin: 07/13/17 08:59 Dose:  1 drop











- Labs


Labs: 


 





 07/12/17 07:20 





 07/12/17 07:20 





 











PT  12.3 Seconds (9.8-13.1)   07/10/17  16:16    


 


INR  1.1  (0.9-1.2)   07/10/17  16:16    


 


APTT  31.5 Seconds (25.6-37.1)   07/10/17  16:16

## 2017-07-13 NOTE — CP.PCM.PN
Subjective





- Date & Time of Evaluation


Date of Evaluation: 07/13/17


Time of Evaluation: 17:40





- Subjective


Subjective: 





doing well





Objective





- Vital Signs/Intake and Output


Vital Signs (last 24 hours): 


 











Temp Pulse Resp BP Pulse Ox


 


 98.4 F   76   18   146/76   94 L


 


 07/13/17 15:43  07/13/17 16:55  07/13/17 15:43  07/13/17 16:55  07/13/17 15:43











- Medications


Medications: 


 Current Medications





Alprazolam (Xanax)  0.5 mg PO TID PRN


   PRN Reason: Anxiety


   Last Admin: 07/12/17 21:42 Dose:  0.5 mg


Diltiazem HCl (Cardizem)  [facility]ID [facility]


   Last Admin: 07/13/17 16:55 Dose:  60 mg


Dorzolamide HCl (Trusopt)  1 drop OS BID [facility]


   Last Admin: 07/13/17 16:54 Dose:  1 drop


Ciprofloxacin (Cipro 400mg/200ml Dsw)  400 mg in 200 mls @ 200 mls/hr IVPB ON 

CALL ONE


   Stop: 07/14/17 10:59


Indomethacin (Indocin Suppository)  100 mg WA ONCE ONE


   Stop: 07/14/17 10:01


Latanoprost (Xalatan Opht)  1 drop OU HS [facility]


   Last Admin: 07/12/17 21:42 Dose:  1 drop


Timolol Maleate (Timoptic 0.25% Ophth Soln)  1 drop OS BID [facility]


   Last Admin: 07/13/17 16:54 Dose:  1 drop











- Labs


Labs: 


 





 07/12/17 07:20 





 07/12/17 07:20 





 











PT  12.3 Seconds (9.8-13.1)   07/10/17  16:16    


 


INR  1.1  (0.9-1.2)   07/10/17  16:16    


 


APTT  31.5 Seconds (25.6-37.1)   07/10/17  16:16    














- GI/Abdominal Exam


GI & Abdominal Exam: Soft, Normal Bowel Sounds





Assessment and Plan





- Assessment and Plan (Free Text)


Assessment: 





83 yo female with choledocholithiasis





ercp in am

## 2017-07-14 LAB
ALBUMIN SERPL-MCNC: 4.3 G/DL (ref 3.5–5)
ALBUMIN/GLOB SERPL: 1.2 {RATIO} (ref 1–2.1)
ALT SERPL-CCNC: 44 U/L (ref 9–52)
AMYLASE SERPL-CCNC: 125 U/L (ref 30–110)
AST SERPL-CCNC: 36 U/L (ref 14–36)
BASOPHILS # BLD AUTO: 0.1 K/UL (ref 0–0.2)
BASOPHILS NFR BLD: 1 % (ref 0–2)
BUN SERPL-MCNC: 18 MG/DL (ref 7–17)
CALCIUM SERPL-MCNC: 9.4 MG/DL (ref 8.4–10.2)
EOSINOPHIL # BLD AUTO: 0.3 K/UL (ref 0–0.7)
EOSINOPHIL NFR BLD: 4.1 % (ref 0–4)
ERYTHROCYTE [DISTWIDTH] IN BLOOD BY AUTOMATED COUNT: 13.8 % (ref 11.5–14.5)
ERYTHROCYTE [DISTWIDTH] IN BLOOD BY AUTOMATED COUNT: 13.9 % (ref 11.5–14.5)
GFR NON-AFRICAN AMERICAN: > 60
HGB BLD-MCNC: 14.3 G/DL (ref 12–16)
HGB BLD-MCNC: 15.5 G/DL (ref 12–16)
LIPASE SERPL-CCNC: 686 U/L (ref 23–300)
LYMPHOCYTES # BLD AUTO: 1.4 K/UL (ref 1–4.3)
LYMPHOCYTES NFR BLD AUTO: 17.1 % (ref 20–40)
MCH RBC QN AUTO: 28.2 PG (ref 27–31)
MCH RBC QN AUTO: 28.9 PG (ref 27–31)
MCHC RBC AUTO-ENTMCNC: 32.4 G/DL (ref 33–37)
MCHC RBC AUTO-ENTMCNC: 33.4 G/DL (ref 33–37)
MCV RBC AUTO: 86.6 FL (ref 81–99)
MCV RBC AUTO: 87 FL (ref 81–99)
MONOCYTES # BLD: 0.6 K/UL (ref 0–0.8)
MONOCYTES NFR BLD: 6.9 % (ref 0–10)
NEUTROPHILS # BLD: 5.9 K/UL (ref 1.8–7)
NEUTROPHILS NFR BLD AUTO: 70.9 % (ref 50–75)
NRBC BLD AUTO-RTO: 0.1 % (ref 0–0)
PLATELET # BLD: 170 K/UL (ref 130–400)
PLATELET # BLD: 198 K/UL (ref 130–400)
PMV BLD AUTO: 8.6 FL (ref 7.2–11.7)
RBC # BLD AUTO: 5.08 MIL/UL (ref 3.8–5.2)
RBC # BLD AUTO: 5.34 MIL/UL (ref 3.8–5.2)
WBC # BLD AUTO: 12.7 K/UL (ref 4.8–10.8)
WBC # BLD AUTO: 8.3 K/UL (ref 4.8–10.8)

## 2017-07-14 PROCEDURE — 0F798ZZ DILATION OF COMMON BILE DUCT, VIA NATURAL OR ARTIFICIAL OPENING ENDOSCOPIC: ICD-10-PCS | Performed by: INTERNAL MEDICINE

## 2017-07-14 PROCEDURE — BF10YZZ FLUOROSCOPY OF BILE DUCTS USING OTHER CONTRAST: ICD-10-PCS | Performed by: INTERNAL MEDICINE

## 2017-07-14 RX ADMIN — TIMOLOL MALEATE SCH DROP: 2.5 SOLUTION OPHTHALMIC at 09:02

## 2017-07-14 RX ADMIN — CIPROFLOXACIN SCH MLS/HR: 2 INJECTION, SOLUTION INTRAVENOUS at 21:14

## 2017-07-14 RX ADMIN — DORZOLAMIDE HYDROCHLORIDE SCH DROP: 20 SOLUTION OPHTHALMIC at 09:02

## 2017-07-14 RX ADMIN — LATANOPROST SCH DROP: 50 SOLUTION OPHTHALMIC at 21:23

## 2017-07-14 RX ADMIN — TIMOLOL MALEATE SCH DROP: 2.5 SOLUTION OPHTHALMIC at 16:34

## 2017-07-14 RX ADMIN — DORZOLAMIDE HYDROCHLORIDE SCH DROP: 20 SOLUTION OPHTHALMIC at 16:34

## 2017-07-14 NOTE — CP.PCM.PN
Subjective





- Date & Time of Evaluation


Date of Evaluation: 07/14/17


Time of Evaluation: 08:49





- Subjective


Subjective: 





Pt is going to have a ERCP today. She will need to be transfused with factor 

VIII, prior to nsurgery. I spojke to Leonila,(head of pharmacy) who will have the 

product available by 10 am today. if there is bleeding post procedure, more 

will be available, but I dont feel pt will need it.





Objective





- Vital Signs/Intake and Output


Vital Signs (last 24 hours): 


 











Temp Pulse Resp BP Pulse Ox


 


 97.5 F L  77   18   160/85 H  96 


 


 07/14/17 07:40  07/14/17 07:40  07/14/17 07:40  07/14/17 07:40  07/14/17 07:40











- Medications


Medications: 


 Current Medications





Alprazolam (Xanax)  0.5 mg PO TID PRN


   PRN Reason: Anxiety


   Last Admin: 07/13/17 22:24 Dose:  0.5 mg


Diltiazem HCl (Cardizem)  60 mg PO QID [facility]


   Last Admin: 07/13/17 21:42 Dose:  60 mg


Dorzolamide HCl (Trusopt)  1 drop OS BID [facility]


   Last Admin: 07/13/17 16:54 Dose:  1 drop


Ciprofloxacin (Cipro 400mg/200ml Dsw)  400 mg in 200 mls @ 200 mls/hr IVPB ON 

CALL ONE


   Stop: 07/14/17 10:59


Indomethacin (Indocin Suppository)  100 mg [place] ONCE ONE


   Stop: 07/14/17 10:01


Latanoprost (Xalatan Opht)  1 drop OU HS [facility]


   Last Admin: 07/13/17 21:45 Dose:  1 drop


Timolol Maleate (Timoptic 0.25% Ophth Soln)  1 drop OS BID [facility]


   Last Admin: 07/13/17 16:54 Dose:  1 drop











- Labs


Labs: 


 





 07/12/17 07:20 





 07/12/17 07:20 





 











PT  12.3 Seconds (9.8-13.1)   07/10/17  16:16    


 


INR  1.1  (0.9-1.2)   07/10/17  16:16    


 


APTT  31.5 Seconds (25.6-37.1)   07/10/17  16:16

## 2017-07-14 NOTE — CP.PCM.PN
Subjective





- Date & Time of Evaluation


Date of Evaluation: 07/14/17


Time of Evaluation: 09:00





- Subjective


Subjective: 





The patient is resting quietly in bed.


She is scheduled for ERCP today


Dr ADDIE Randhawa will be giving the patient  factor VIII





The patient is refusing to have a lap cholecystectomy


If the stones are not removed today











Objective





- Vital Signs/Intake and Output


Vital Signs (last 24 hours): 


 











Temp Pulse Resp BP Pulse Ox


 


 97.5 F L  77   18   160/85 H  96 


 


 07/14/17 07:40  07/14/17 07:40  07/14/17 07:40  07/14/17 07:40  07/14/17 07:40











- Medications


Medications: 


 Current Medications





Alprazolam (Xanax)  0.5 mg PO TID PRN


   PRN Reason: Anxiety


   Last Admin: 07/13/17 22:24 Dose:  0.5 mg


Diltiazem HCl (Cardizem)  60 mg PO QID [facility]


   Last Admin: 07/14/17 09:01 Dose:  Not Given


Dorzolamide HCl (Trusopt)  1 drop OS BID [facility]


   Last Admin: 07/14/17 09:02 Dose:  1 drop


Ciprofloxacin (Cipro 400mg/200ml Dsw)  400 mg in 200 mls @ 200 mls/hr IVPB ON 

CALL ONE


   Stop: 07/14/17 10:59


   Last Admin: 07/14/17 09:02 Dose:  200 mls/hr


Latanoprost (Xalatan Opht)  1 drop OU HS [facility]


   Last Admin: 07/13/17 21:45 Dose:  1 drop


Timolol Maleate (Timoptic 0.25% Ophth Soln)  1 drop OS BID [facility]


   Last Admin: 07/14/17 09:02 Dose:  1 drop











- Labs


Labs: 


 





 07/14/17 08:40 





 07/14/17 08:40 





 











PT  12.3 Seconds (9.8-13.1)   07/10/17  16:16    


 


INR  1.1  (0.9-1.2)   07/10/17  16:16    


 


APTT  31.5 Seconds (25.6-37.1)   07/10/17  16:16    














Assessment and Plan








(2) Cholelithiasis


Status: Acute   





(3) Rectal bleeding


Status: Acute   





(4) Essential (primary) hypertension


Status: Acute   





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 

exposure to volcanic air pollution


Status: Acute   





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


Status: Acute   





(7) Herniated lumbar intervertebral disc


Status: Acute

## 2017-07-14 NOTE — CP.PCM.PN
Subjective





- Date & Time of Evaluation


Date of Evaluation: 07/14/17


Time of Evaluation: 15:27





- Subjective


Subjective: 





ERCP done multiple stones removed


pt can be d/c ed in AM





Objective





- Vital Signs/Intake and Output


Vital Signs (last 24 hours): 


 











Temp Pulse Resp BP Pulse Ox


 


 97 F L  73   13   144/80   100 


 


 07/14/17 13:36  07/14/17 13:36  07/14/17 13:36  07/14/17 13:36  07/14/17 13:36








Intake and Output: 


 











 07/14/17 07/14/17





 06:59 18:59


 


Intake Total  950


 


Balance  950














- Medications


Medications: 


 Current Medications





Alprazolam (Xanax)  0.5 mg PO TID PRN


   PRN Reason: Anxiety


   Last Admin: 07/13/17 22:24 Dose:  0.5 mg


Diltiazem HCl (Cardizem)  60 mg PO QID [facility]


   Last Admin: 07/14/17 13:19 Dose:  Not Given


Dorzolamide HCl (Trusopt)  1 drop OS BID [facility]


   Last Admin: 07/14/17 09:02 Dose:  1 drop


Hydromorphone HCl (Dilaudid)  0.5 mg IVP Q6 PRN


   PRN Reason: Pain, moderate (4-7)


Ciprofloxacin (Cipro 400mg/200ml Dsw)  400 mg in 200 mls @ 200 mls/hr IVPB Q12 

ANITA


Lactated Ringer's (Lactated Ringer's)  1,000 mls @ 100 mls/hr IV .Q10H ANITA


Latanoprost (Xalatan Opht)  1 drop OU HS [facility]


   Last Admin: 07/13/17 21:45 Dose:  1 drop


Timolol Maleate (Timoptic 0.25% Ophth Soln)  1 drop OS BID [facility]


   Last Admin: 07/14/17 09:02 Dose:  1 drop











- Labs


Labs: 


 





 07/14/17 08:40 





 07/14/17 08:40 





 











PT  12.3 Seconds (9.8-13.1)   07/10/17  16:16    


 


INR  1.1  (0.9-1.2)   07/10/17  16:16    


 


APTT  31.5 Seconds (25.6-37.1)   07/10/17  16:16    














Assessment and Plan








(2) Cholelithiasis


Status: Acute   





(3) Rectal bleeding


Status: Acute   





(4) Essential (primary) hypertension


Status: Acute   





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 

exposure to volcanic air pollution


Status: Acute   





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


Status: Acute   





(7) Herniated lumbar intervertebral disc


Status: Acute

## 2017-07-14 NOTE — CP.PCM.PN
<ArturGisell - Last Filed: 07/14/17 08:05>





Subjective





- Date & Time of Evaluation


Date of Evaluation: 07/14/17


Time of Evaluation: 08:05





- Subjective


Subjective: 





Surgery for Dr. Gill 





Pt s&eBalaji COATS. NPO for ERCP today. Denies F/C/N/V/D/Cp/SOB. +void. 





Objective





- Vital Signs/Intake and Output


Vital Signs (last 24 hours): 


 











Temp Pulse Resp BP Pulse Ox


 


 97.5 F L  77   18   160/85 H  96 


 


 07/14/17 07:40  07/14/17 07:40  07/14/17 07:40  07/14/17 07:40  07/14/17 07:40











- Medications


Medications: 


 Current Medications





Alprazolam (Xanax)  0.5 mg PO TID PRN


   PRN Reason: Anxiety


   Last Admin: 07/13/17 22:24 Dose:  0.5 mg


Diltiazem HCl (Cardizem)  60 mg PO QID [facility]


   Last Admin: 07/13/17 21:42 Dose:  60 mg


Dorzolamide HCl (Trusopt)  1 drop OS BID [facility]


   Last Admin: 07/13/17 16:54 Dose:  1 drop


Ciprofloxacin (Cipro 400mg/200ml Dsw)  400 mg in 200 mls @ 200 mls/hr IVPB ON 

CALL ONE


   Stop: 07/14/17 10:59


Indomethacin (Indocin Suppository)  100 mg OK ONCE ONE


   Stop: 07/14/17 10:01


Latanoprost (Xalatan Opht)  1 drop OU HS [facility]


   Last Admin: 07/13/17 21:45 Dose:  1 drop


Timolol Maleate (Timoptic 0.25% Ophth Soln)  1 drop OS BID [facility]


   Last Admin: 07/13/17 16:54 Dose:  1 drop











- Labs


Labs: 


 





 07/12/17 07:20 





 07/12/17 07:20 





 











PT  12.3 Seconds (9.8-13.1)   07/10/17  16:16    


 


INR  1.1  (0.9-1.2)   07/10/17  16:16    


 


APTT  31.5 Seconds (25.6-37.1)   07/10/17  16:16    














- Constitutional


Appears: No Acute Distress





- Head Exam


Head Exam: ATRAUMATIC, NORMAL INSPECTION, NORMOCEPHALIC





- Eye Exam


Eye Exam: EOMI, Normal appearance, PERRL


Pupil Exam: NORMAL ACCOMODATION, PERRL





- ENT Exam


ENT Exam: Mucous Membranes Moist, Normal Exam





- Neck Exam


Neck Exam: Full ROM, Normal Inspection.  absent: Lymphadenopathy





- Respiratory Exam


Respiratory Exam: Clear to Ausculation Bilateral, NORMAL BREATHING PATTERN





- Cardiovascular Exam


Cardiovascular Exam: REGULAR RHYTHM, +S1, +S2.  absent: Murmur





- GI/Abdominal Exam


GI & Abdominal Exam: Soft, Normal Bowel Sounds.  absent: Distended, Firm, 

Guarding, Rigid, Tenderness





- Extremities Exam


Extremities Exam: Full ROM, Normal Capillary Refill, Normal Inspection.  absent

: Joint Swelling, Pedal Edema





- Back Exam


Back Exam: NORMAL INSPECTION





- Neurological Exam


Neurological Exam: Alert, Awake, CN II-XII Intact, Normal Gait, Oriented x3





- Psychiatric Exam


Psychiatric exam: Normal Affect, Normal Mood





- Skin


Skin Exam: Dry, Intact, Normal Color, Warm.  absent: Erythema





Assessment and Plan





- Assessment and Plan (Free Text)


Assessment: 





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


MRCP/CT : choledocolithiasis 


LFT : wnl 





-F/U ERCP today 


-symptoms improved, LFTs/CBC WNL


-c/w current medical management


-will continue to follow


-d/w attending





<Matthew Gill - Last Filed: 07/14/17 10:38>





Subjective





- Date & Time of Evaluation


Time of Evaluation: 10:00





- Subjective


Subjective: 





Patient was seen and examined at the bedside.  Agree with resident's note above.





Objective





- Vital Signs/Intake and Output


Vital Signs (last 24 hours): 


 











Temp Pulse Resp BP Pulse Ox


 


 97.5 F L  77   18   160/85 H  96 


 


 07/14/17 07:40  07/14/17 07:40  07/14/17 07:40  07/14/17 07:40  07/14/17 07:40











- Medications


Medications: 


 Current Medications





Alprazolam (Xanax)  0.5 mg PO TID PRN


   PRN Reason: Anxiety


   Last Admin: 07/13/17 22:24 Dose:  0.5 mg


Diltiazem HCl (Cardizem)  60 mg PO QID ANITA


   Last Admin: 07/14/17 09:01 Dose:  Not Given


Dorzolamide HCl (Trusopt)  1 drop OS BID ANITA


   Last Admin: 07/14/17 09:02 Dose:  1 drop


Ciprofloxacin (Cipro 400mg/200ml Dsw)  400 mg in 200 mls @ 200 mls/hr IVPB ON 

CALL ONE


   Stop: 07/14/17 10:59


   Last Admin: 07/14/17 09:02 Dose:  200 mls/hr


Latanoprost (Xalatan Opht)  1 drop OU HS ANITA


   Last Admin: 07/13/17 21:45 Dose:  1 drop


Timolol Maleate (Timoptic 0.25% Ophth Soln)  1 drop OS BID ANITA


   Last Admin: 07/14/17 09:02 Dose:  1 drop











- Labs


Labs: 


 





 07/12/17 07:20 





 07/14/17 08:40 





 











PT  12.3 Seconds (9.8-13.1)   07/10/17  16:16    


 


INR  1.1  (0.9-1.2)   07/10/17  16:16    


 


APTT  31.5 Seconds (25.6-37.1)   07/10/17  16:16

## 2017-07-15 VITALS
RESPIRATION RATE: 17 BRPM | TEMPERATURE: 98.2 F | HEART RATE: 80 BPM | DIASTOLIC BLOOD PRESSURE: 77 MMHG | SYSTOLIC BLOOD PRESSURE: 149 MMHG

## 2017-07-15 LAB
ALBUMIN SERPL-MCNC: 3.9 G/DL (ref 3.5–5)
ALBUMIN/GLOB SERPL: 1.2 {RATIO} (ref 1–2.1)
ALT SERPL-CCNC: 28 U/L (ref 9–52)
ANISOCYTOSIS BLD QL SMEAR: SLIGHT
AST SERPL-CCNC: 27 U/L (ref 14–36)
BASOPHILS # BLD AUTO: 0.1 K/UL (ref 0–0.2)
BASOPHILS NFR BLD: 0.6 % (ref 0–2)
BUN SERPL-MCNC: 14 MG/DL (ref 7–17)
CALCIUM SERPL-MCNC: 8.9 MG/DL (ref 8.4–10.2)
EOSINOPHIL # BLD AUTO: 0.1 K/UL (ref 0–0.7)
EOSINOPHIL NFR BLD: 0.6 % (ref 0–4)
ERYTHROCYTE [DISTWIDTH] IN BLOOD BY AUTOMATED COUNT: 14.1 % (ref 11.5–14.5)
GFR NON-AFRICAN AMERICAN: > 60
HGB BLD-MCNC: 14.4 G/DL (ref 12–16)
LIPASE SERPL-CCNC: 285 U/L (ref 23–300)
LYMPHOCYTE: 5 % (ref 20–50)
LYMPHOCYTES # BLD AUTO: 0.7 K/UL (ref 1–4.3)
LYMPHOCYTES NFR BLD AUTO: 5.3 % (ref 20–40)
MCH RBC QN AUTO: 28.3 PG (ref 27–31)
MCHC RBC AUTO-ENTMCNC: 33 G/DL (ref 33–37)
MCV RBC AUTO: 85.9 FL (ref 81–99)
MONOCYTE: 5 % (ref 0–10)
MONOCYTES # BLD: 0.5 K/UL (ref 0–0.8)
MONOCYTES NFR BLD: 4 % (ref 0–10)
NEUTROPHILS # BLD: 12 K/UL (ref 1.8–7)
NEUTROPHILS NFR BLD AUTO: 89.5 % (ref 50–75)
NEUTROPHILS NFR BLD AUTO: 90 % (ref 42–75)
NRBC BLD AUTO-RTO: 0 % (ref 0–0)
OVALOCYTES BLD QL SMEAR: SLIGHT
PLATELET # BLD EST: NORMAL 10*3/UL
PLATELET # BLD: 171 K/UL (ref 130–400)
PMV BLD AUTO: 8.4 FL (ref 7.2–11.7)
RBC # BLD AUTO: 5.08 MIL/UL (ref 3.8–5.2)
TEARDROP CELLS: SLIGHT
TOTAL CELLS COUNTED BLD: 100
WBC # BLD AUTO: 13.4 K/UL (ref 4.8–10.8)

## 2017-07-15 RX ADMIN — CIPROFLOXACIN SCH MLS/HR: 2 INJECTION, SOLUTION INTRAVENOUS at 09:19

## 2017-07-15 RX ADMIN — DORZOLAMIDE HYDROCHLORIDE SCH DROP: 20 SOLUTION OPHTHALMIC at 09:16

## 2017-07-15 RX ADMIN — TIMOLOL MALEATE SCH DROP: 2.5 SOLUTION OPHTHALMIC at 09:16

## 2017-07-15 NOTE — CP.PCM.PN
Subjective





- Date & Time of Evaluation


Date of Evaluation: 07/15/17


Time of Evaluation: 05:45





- Subjective


Subjective: 





General surgery progress note for Dr. Frantz Burgos, PGY-1





Pt S & E at bedside. 





Pt reports "brown spit up" x 3 overnight, did not notify nurse, admits to 

nausea and chills s/p ERCP yesterday, chronic constipation.  Tolerated CLD. 

Denies fevers, SOB, CP, diarrhea, abdominal pain. 





Objective





- Vital Signs/Intake and Output


Vital Signs (last 24 hours): 


 











Temp Pulse Resp BP Pulse Ox


 


 97.7 F   71   19   153/80 H  97 


 


 07/15/17 01:00  07/15/17 01:00  07/15/17 01:00  07/15/17 01:00  07/15/17 01:00











- Medications


Medications: 


 Current Medications





Alprazolam (Xanax)  0.5 mg PO TID PRN


   PRN Reason: Anxiety


   Last Admin: 07/13/17 22:24 Dose:  0.5 mg


Diltiazem HCl (Cardizem)  60 mg PO QID [facility]


   Last Admin: 07/14/17 21:19 Dose:  60 mg


Dorzolamide HCl (Trusopt)  1 drop OS BID [facility]


   Last Admin: 07/14/17 16:34 Dose:  1 drop


Hydromorphone HCl (Dilaudid)  0.5 mg IVP Q6 PRN


   PRN Reason: Pain, moderate (4-7)


   Last Admin: 07/14/17 15:32 Dose:  0.5 mg


Ciprofloxacin (Cipro 400mg/200ml Dsw)  400 mg in 200 mls @ 200 mls/hr IVPB Q12 

ANITA


   Last Admin: 07/14/17 21:14 Dose:  200 mls/hr


Lactated Ringer's (Lactated Ringer's)  1,000 mls @ 125 mls/hr IV .Q8H ANITA


   Last Admin: 07/15/17 05:22 Dose:  125 mls/hr


Latanoprost (Xalatan Opht)  1 drop OU HS ANITA


   Last Admin: 07/14/17 21:23 Dose:  1 drop


Timolol Maleate (Timoptic 0.25% Ophth Soln)  1 drop OS BID [facility]


   Last Admin: 07/14/17 16:34 Dose:  1 drop











- Labs


Labs: 


 





 07/14/17 17:54 





 07/14/17 08:40 





 











PT  12.3 Seconds (9.8-13.1)   07/10/17  16:16    


 


INR  1.1  (0.9-1.2)   07/10/17  16:16    


 


APTT  31.5 Seconds (25.6-37.1)   07/10/17  16:16    














- Constitutional


Appears: Non-toxic, In Acute Distress





- Head Exam


Head Exam: ATRAUMATIC, NORMAL INSPECTION, NORMOCEPHALIC





- Eye Exam


Eye Exam: EOMI, Normal appearance





- ENT Exam


ENT Exam: Mucous Membranes Moist, Normal Exam





- Neck Exam


Neck Exam: Full ROM





- Respiratory Exam


Respiratory Exam: Clear to Ausculation Bilateral, NORMAL BREATHING PATTERN





- Cardiovascular Exam


Cardiovascular Exam: REGULAR RHYTHM, +S1, +S2





- GI/Abdominal Exam


GI & Abdominal Exam: Soft, Normal Bowel Sounds.  absent: Distended, Firm, 

Guarding, Rigid, Tenderness





- Extremities Exam


Extremities Exam: Normal Inspection.  absent: Pedal Edema





- Neurological Exam


Neurological Exam: Alert, Awake, CN II-XII Intact, Oriented x3





- Psychiatric Exam


Psychiatric exam: Normal Affect, Normal Mood





- Skin


Skin Exam: Dry, Intact, Normal Color, Warm





Assessment and Plan





- Assessment and Plan (Free Text)


Assessment: 





84F w/choledocholithiasis s/p ERCP w/removal of multiple stones POD #1


Plan: 





Cont medical mgmt as per primary team


Clinically stable


Will follow


Further recs as per attending





Will PORFIRIO attending





Loretta, PGY1

## 2017-07-19 VITALS — OXYGEN SATURATION: 98 %

## 2017-07-19 NOTE — RAD
PROCEDURE:  ERCP



HISTORY:



COMPARISON:

None



TECHNIQUE:

Standard protocol for this study/examination.



FINDINGS:

 Total fluoroscopic time (continuous mode) utilized during the 

procedure: 6.7 minutes



IMPRESSION:

Less than 1 hr fluoroscopic time utilized during performance of the 

procedure.

## 2017-07-25 NOTE — CON
Dr. Adrian Garcia

[facility]



Chief complaint: Abdominal pain



HPI: This is a 85 y/o female that come into my service. Essentially has a Hx of 
hypertension, COPD, CAD, hemophilia and anxiety who comes in down pain 
discomfort, and complaints of rectal bleeding. Pt is baseline constipated and 
requires laxatives to help with her bowel movement. The pain she was having is 
in the LLQ some _____

No nausea no vomit, no biliary ____. no fever/chills. Past bowel movement was 
two to three days ago. 

Currently lying comfortably and in no apparently distress.

PMhx: as above.

SocHx: as above.



ROS:______negative and positive in HPI pertinent findings



PE:

Vitals:________

Head:______________

Eyes: PERRL, ________no or icterus

Neck: Supple, normal ROM, non appreciative Lumps

Lungs

Heart: S1 S2 with RRR

Abdomen: Soft, Some discomfort in the LLQ___________

Labs:

Radiology have been reviewed



Hct: 44.3 (?)

Platelet count 189

BUN and Creatinine are unremarkable and normal

______are normal 

CAT scan of the abdomen/pelvis ______shows constipation, distended______



Assessment and plan: this is an 85 y/o female____ the pain is most likely 
secondary to the constipation, for which I will give her Magnesia ____ and 
Dulcolax cocktail . _____Many years ago, considering the active bleeding, could 
be elective as well, regarding the coledocolitiasis seen in imaging _____which 
is persistent. cholangitis _____stones could be a cause for pain. However, 
given these findings at some point elective cholecystectomy and elective ERCP 
could be considered, pending family wishes. If that is the case, we need to 
request hematology input regarding hemophilia and risk of bleeding, for now ____
_and will be discussed_____ with family as well, for any further required 
management.

Thank you for the consult, please proceed Dr. Guevara.







________________

Adrian Garcia MDD

## 2018-04-13 ENCOUNTER — HOSPITAL ENCOUNTER (EMERGENCY)
Dept: HOSPITAL 14 - H.ER | Age: 83
Discharge: HOME | End: 2018-04-13
Payer: MEDICARE

## 2018-04-13 VITALS
RESPIRATION RATE: 20 BRPM | HEART RATE: 61 BPM | OXYGEN SATURATION: 94 % | DIASTOLIC BLOOD PRESSURE: 69 MMHG | TEMPERATURE: 97.8 F | SYSTOLIC BLOOD PRESSURE: 116 MMHG

## 2018-04-13 VITALS — BODY MASS INDEX: 25.8 KG/M2

## 2018-04-13 DIAGNOSIS — Z95.0: ICD-10-CM

## 2018-04-13 DIAGNOSIS — R42: Primary | ICD-10-CM

## 2018-04-13 DIAGNOSIS — I48.91: ICD-10-CM

## 2018-04-13 LAB
ALBUMIN SERPL-MCNC: 4.1 G/DL (ref 3.5–5)
ALBUMIN/GLOB SERPL: 1.1 {RATIO} (ref 1–2.1)
ALT SERPL-CCNC: 30 U/L (ref 9–52)
AST SERPL-CCNC: 20 U/L (ref 14–36)
BASOPHILS # BLD AUTO: 0.1 K/UL (ref 0–0.2)
BASOPHILS NFR BLD: 1 % (ref 0–2)
BUN SERPL-MCNC: 19 MG/DL (ref 7–17)
CALCIUM SERPL-MCNC: 9.5 MG/DL (ref 8.4–10.2)
EOSINOPHIL # BLD AUTO: 0.5 K/UL (ref 0–0.7)
EOSINOPHIL NFR BLD: 5.5 % (ref 0–4)
ERYTHROCYTE [DISTWIDTH] IN BLOOD BY AUTOMATED COUNT: 14.4 % (ref 11.5–14.5)
GFR NON-AFRICAN AMERICAN: 43
HGB BLD-MCNC: 15.5 G/DL (ref 12–16)
LYMPHOCYTES # BLD AUTO: 1.7 K/UL (ref 1–4.3)
LYMPHOCYTES NFR BLD AUTO: 18.3 % (ref 20–40)
MCH RBC QN AUTO: 28.2 PG (ref 27–31)
MCHC RBC AUTO-ENTMCNC: 32.6 G/DL (ref 33–37)
MCV RBC AUTO: 86.6 FL (ref 81–99)
MONOCYTES # BLD: 0.7 K/UL (ref 0–0.8)
MONOCYTES NFR BLD: 8.1 % (ref 0–10)
NEUTROPHILS # BLD: 6.2 K/UL (ref 1.8–7)
NEUTROPHILS NFR BLD AUTO: 67.1 % (ref 50–75)
NRBC BLD AUTO-RTO: 0.1 % (ref 0–0)
PLATELET # BLD: 239 K/UL (ref 130–400)
PMV BLD AUTO: 8.7 FL (ref 7.2–11.7)
RBC # BLD AUTO: 5.48 MIL/UL (ref 3.8–5.2)
WBC # BLD AUTO: 9.3 K/UL (ref 4.8–10.8)

## 2018-04-13 NOTE — ED PDOC
Syncope/Near Syncope/Dizziness


Time Seen by Provider: 04/13/18 17:04


Chief Complaint (Nursing): Dizziness/Lightheaded


Chief Complaint (Provider): Dizziness


History Per: Patient


History/Exam Limitations: no limitations


Onset/Duration Of Symptoms: Days


Current Symptoms Are (Timing): Still Present


Additional Complaint(s): 


84yo female with history of COPD, presents to ED with complaints of dizziness 

with associated nausea for the past couple days but worsened today. She denies 

any associated headache, loss of consciousness or focal weaknesses. She also 

denies any chest pain or shortness of breath. She has no other medical 

complaints. 





Past Medical History


Reviewed: Historical Data, Nursing Documentation, Vital Signs


Vital Signs: 





 Last Vital Signs











Temp  97.8 F   04/13/18 17:02


 


Pulse  61   04/13/18 17:02


 


Resp  20   04/13/18 17:02


 


BP  116/69   04/13/18 17:02


 


Pulse Ox  94 L  04/13/18 17:02














- Medical History


PMH: COPD, Gall Bladder Disease, HTN, Kidney Stones, Pneumonia, Chronic Kidney 

Disease





- Surgical History


Surgical History: No Surg Hx





- Family History


Family History: States: Unknown Family Hx





- Home Medications


Home Medications: 


 Ambulatory Orders











 Medication  Instructions  Recorded


 


ALPRAZolam [Xanax] 1 mg PO DAILY 07/10/17


 


Diltiazem HCl [Cardizem] 60 mg PO QID 07/10/17


 


Dorzolamide HCl 1 drop OS BID 07/10/17


 


Latanoprost 0.005% Opht [Xalatan 1 drop OU HS 07/10/17





Opht]  


 


Metoprolol Tartrate 100 mg PO BID 07/10/17


 


Timolol [Betimol] 1 drop OS BID 07/10/17


 


oxyCODONE/Acetaminophen [Percocet 1 tab PO PRN PRN 07/10/17





5/325 mg Tab]  


 


Ciprofloxacin [Cipro] 500 mg PO Q12 #10 tab 07/14/17


 


Meclizine [Meclizine*] 25 mg PO Q8 #15 tab 04/13/18














- Allergies


Allergies/Adverse Reactions: 


 Allergies











Allergy/AdvReac Type Severity Reaction Status Date / Time


 


Penicillins Allergy  RASH Verified 11/19/16 11:42














Review of Systems


ROS Statement: Except As Marked, All Systems Reviewed And Found Negative


Constitutional: Negative for: Fever, Chills


Cardiovascular: Negative for: Chest Pain


Respiratory: Negative for: Shortness of Breath


Gastrointestinal: Positive for: Nausea.  Negative for: Vomiting


Neurological: Positive for: Dizziness.  Negative for: Headache





Physical Exam





- Reviewed


Nursing Documentation Reviewed: Yes


Vital Signs Reviewed: Yes





- Physical Exam


Appears: Positive for: Non-toxic, No Acute Distress


Head Exam: Positive for: ATRAUMATIC, NORMAL INSPECTION, NORMOCEPHALIC


Skin: Positive for: Normal Color, Warm, Dry


Eye Exam: Positive for: Normal appearance, EOMI, PERRL


Neck: Positive for: Normal, Painless ROM, Supple


Cardiovascular/Chest: Positive for: Regular Rate, Rhythm


Respiratory: Positive for: Normal Breath Sounds.  Negative for: Wheezing


Extremity: Positive for: Normal ROM.  Negative for: Deformity, Swelling


Neurologic/Psych: Positive for: Alert, Oriented.  Negative for: Motor/Sensory 

Deficits





- Laboratory Results


Result Diagrams: 


 04/13/18 17:23





 04/13/18 17:23





- ECG


O2 Sat by Pulse Oximetry: 94





Medical Decision Making


Medical Decision Making: 


Impression: Dizziness


Plan:


-- CT Head w/o contrast


-- EKG


-- Chest X-Ray


-- Labs








Scribe Attestation:


Documented by Cristel Kiran acting as a scribe for Walter Torres MD


Provider Attestation:


All medical record entries made by the Scribe were at my direction and 

personally dictated by me. I have reviewed the chart and agree that the record 

accurately reflects my personal performance of the history, physical exam, 

medical decision making, and the department course for this patient. I have 

also personally directed, reviewed, and agree with the discharge instructions 

and disposition.





Disposition





- Clinical Impression


Clinical Impression: 


 Dizziness








- Patient ED Disposition


Is Patient to be Admitted: No


Discussed With DrBalaji: Dane Guevara


Counseled Patient/Family Regarding: Studies Performed, Diagnosis, Need For 

Followup, Rx Given





- Disposition


Referrals: 


Dane Guevara MD [Staff Provider] - 


Disposition: Routine/Home


Disposition Time: 18:51


Condition: FAIR


Prescriptions: 


Meclizine [Meclizine*] 25 mg PO Q8 #15 tab


Instructions:  Vertigo (a Type of Dizziness)


Forms:  [email or web address] (English)

## 2018-04-13 NOTE — CT
PROCEDURE:  CT HEAD WITHOUT CONTRAST.



HISTORY:

r/o bleed



COMPARISON:

6/16/2013 



TECHNIQUE:

Axial computed tomography images were obtained through the head/brain 

without intravenous contrast.  



Radiation dose:



Total exam DLP = 854.91 mGy-cm.



This CT exam was performed using one or more of the following dose 

reduction techniques: Automated exposure control, adjustment of the 

mA and/or kV according to patient size, and/or use of iterative 

reconstruction technique.



FINDINGS:



HEMORRHAGE:

No intracranial hemorrhage. 



BRAIN:

No mass effect or edema.  Moderate diffuse age-appropriate atrophy. 

Mild periventricular white matter lucency consistent with chronic 

microvascular ischemic change.  No evidence of acute infarct.



VENTRICLES:

No hydrocephalus.  Incidental cavum septum pellucidum. 



CALVARIUM:

Unremarkable.



PARANASAL SINUSES:

Unremarkable as visualized. No significant inflammatory changes.



MASTOID AIR CELLS:

Unremarkable as visualized. No inflammatory changes.



OTHER FINDINGS:

None.



IMPRESSION:

No intracranial hemorrhage.  Age related atrophy and chronic white 

matter ischemic change.

## 2018-04-14 NOTE — RAD
HISTORY:

dizziness  



COMPARISON:

No prior.



TECHNIQUE:

Chest PA and lateral



FINDINGS:



LUNGS:

No active pulmonary disease.



PLEURA:

No significant pleural effusion identified. No pneumothorax apparent.



CARDIOVASCULAR:

Normal. Atherosclerotic aorta. 



OSSEOUS STRUCTURES:

No significant abnormalities.



VISUALIZED UPPER ABDOMEN:

Normal.



OTHER FINDINGS:

None.



IMPRESSION:

No active disease.

## 2018-04-14 NOTE — CARD
--------------- APPROVED REPORT --------------





EKG Measurement

Heart [id number]

[id number]

[id number]

[id number]



<Conclusion>

Atrial fibrillation with slow ventricular response with a competing 

junctional pacemaker

Left axis deviation

Moderate voltage criteria for LVH, may be normal variant

Nonspecific T wave abnormality

Abnormal ECG

## 2018-10-01 ENCOUNTER — HOSPITAL ENCOUNTER (EMERGENCY)
Dept: HOSPITAL 14 - H.ER | Age: 83
Discharge: HOME | End: 2018-10-01
Payer: MEDICARE

## 2018-10-01 VITALS — BODY MASS INDEX: 25.8 KG/M2

## 2018-10-01 VITALS — TEMPERATURE: 98 F | OXYGEN SATURATION: 96 %

## 2018-10-01 VITALS — DIASTOLIC BLOOD PRESSURE: 60 MMHG | HEART RATE: 68 BPM | SYSTOLIC BLOOD PRESSURE: 140 MMHG | RESPIRATION RATE: 18 BRPM

## 2018-10-01 DIAGNOSIS — I12.9: ICD-10-CM

## 2018-10-01 DIAGNOSIS — G89.29: ICD-10-CM

## 2018-10-01 DIAGNOSIS — M19.90: ICD-10-CM

## 2018-10-01 DIAGNOSIS — M25.559: Primary | ICD-10-CM

## 2018-10-01 DIAGNOSIS — Z88.0: ICD-10-CM

## 2018-10-01 DIAGNOSIS — I48.91: ICD-10-CM

## 2018-10-01 LAB
BASE EXCESS BLDV CALC-SCNC: 1.3 MMOL/L (ref 0–2)
BASOPHILS # BLD AUTO: 0.1 K/UL (ref 0–0.2)
BASOPHILS NFR BLD: 1.3 % (ref 0–2)
BUN SERPL-MCNC: 22 MG/DL (ref 7–17)
CALCIUM SERPL-MCNC: 9.5 MG/DL (ref 8.4–10.2)
EOSINOPHIL # BLD AUTO: 0.5 K/UL (ref 0–0.7)
EOSINOPHIL NFR BLD: 4.7 % (ref 0–4)
ERYTHROCYTE [DISTWIDTH] IN BLOOD BY AUTOMATED COUNT: 14.7 % (ref 11.5–14.5)
GFR NON-AFRICAN AMERICAN: 60
HGB BLD-MCNC: 15.5 G/DL (ref 12–16)
LYMPHOCYTES # BLD AUTO: 2 K/UL (ref 1–4.3)
LYMPHOCYTES NFR BLD AUTO: 20.3 % (ref 20–40)
MCH RBC QN AUTO: 28.5 PG (ref 27–31)
MCHC RBC AUTO-ENTMCNC: 33.1 G/DL (ref 33–37)
MCV RBC AUTO: 86.3 FL (ref 81–99)
MONOCYTES # BLD: 0.5 K/UL (ref 0–0.8)
MONOCYTES NFR BLD: 5.1 % (ref 0–10)
NEUTROPHILS # BLD: 6.6 K/UL (ref 1.8–7)
NEUTROPHILS NFR BLD AUTO: 68.6 % (ref 50–75)
NRBC BLD AUTO-RTO: 0 % (ref 0–0)
PCO2 BLDV: 47 MMHG (ref 40–60)
PH BLDV: 7.37 [PH] (ref 7.32–7.43)
PLATELET # BLD: 189 K/UL (ref 130–400)
PMV BLD AUTO: 8.3 FL (ref 7.2–11.7)
RBC # BLD AUTO: 5.43 MIL/UL (ref 3.8–5.2)
VENOUS BLOOD FIO2: 21 %
VENOUS BLOOD GAS PO2: 33 MM/HG (ref 30–55)
WBC # BLD AUTO: 9.6 K/UL (ref 4.8–10.8)

## 2018-10-01 NOTE — ED PDOC
Lower Extremity Pain/Injury


Time Seen by Provider: 10/01/18 14:53


Chief Complaint (Nursing): Lower Extremity Problem/Injury


Chief Complaint (Provider): Lower Extremity Problem/Injury


History Per: Patient (Lower Extremity Problem/Injury)


History/Exam Limitations: no limitations


Onset/Duration Of Symptoms: Hrs


Current Symptoms Are (Timing): Still Present


Additional Complaint(s): 


84 y/o female presents to the ED for worsened bilateral hip pain and extensive 

arthritis at baseline. Patient uses a walker to ambulate and is being taken care

of by her  and daughter. Patient states she was shopping at [place] when

her pain became so severe she could not walk anymore thus prompting today's 

visit. Patient denies any fall, other accidents, fever and change in health. 

Daughter presents at bedside and confirms patient's history. Patient reports of 

living on a 5th floor walkup and is now concerned because it has become 

difficult for her to move. Daughter additionally concerned as mother is starting

to show signs of early Alzheimer's. Patient has a PMHx of HTN, COPD and chronic 

bronchitis. Patient reports of taking Excedrin and does not remember what her 

other medications are. Patient additionally reports of taking hydrocodone in the

past but states she does not take it anymore because it makes her sleep and 

confused after she takes the medications. 





PMD: Dane Guevara





Past Medical History


Reviewed: Historical Data, Nursing Documentation, Vital Signs


Vital Signs: 





                                Last Vital Signs











Temp  98.0 F   10/01/18 14:29


 


Pulse  66   10/01/18 14:29


 


Resp  20   10/01/18 14:29


 


BP  149/54 L  10/01/18 14:29


 


Pulse Ox  96   10/01/18 14:29














- Medical History


PMH: Arthritis, Bronchitis (Chronic), COPD, Gall Bladder Disease, HTN, Kidney 

Stones, Pneumonia, Chronic Kidney Disease





- Surgical History


Surgical History: No Surg Hx





- Family History


Family History: States: Unknown Family Hx





- Home Medications


Home Medications: 


                                Ambulatory Orders











 Medication  Instructions  Recorded


 


ALPRAZolam [Xanax] 1 mg PO DAILY 07/10/17


 


Diltiazem HCl [Cardizem] 60 mg PO QID 07/10/17


 


Dorzolamide HCl 1 drop OS BID 07/10/17


 


Latanoprost 0.005% Opht [Xalatan 1 drop OU HS 07/10/17





Opht]  


 


Metoprolol Tartrate 100 mg PO BID 07/10/17


 


Timolol [Betimol] 1 drop OS BID 07/10/17


 


oxyCODONE/Acetaminophen [Percocet 1 tab PO PRN PRN 07/10/17





5/325 mg Tab]  


 


Ciprofloxacin [Cipro] 500 mg PO Q12 #10 tab 07/14/17


 


Meclizine [Meclizine*] 25 mg PO Q8 #15 tab 04/13/18


 


Naproxen 500 mg PO BID #14 tab 10/01/18














- Allergies


Allergies/Adverse Reactions: 


                                    Allergies











Allergy/AdvReac Type Severity Reaction Status Date / Time


 


Penicillins Allergy  RASH Verified 10/01/18 14:29














Review of Systems


ROS Statement: Except As Marked, All Systems Reviewed And Found Negative


Musculoskeletal: Positive for: Other (bilateral hip pain)





Physical Exam





- Reviewed


Nursing Documentation Reviewed: Yes


Vital Signs Reviewed: Yes





- Physical Exam


Appears: Positive for: No Acute Distress, Uncomfortable (Slow on ambulation. 

However, able to ambulate with walker)


Back: Positive for: Normal Inspection.  Negative for: L CVA Tenderness, R CVA 

Tenderness, Vertebral Tenderness, Other (Tenderness to palpation of the spine)


Extremity: Negative for: Deformity (of hips)


Neurologic/Psych: Positive for: Alert, Oriented (x3).  Negative for: 

Motor/Sensory Deficits





- Laboratory Results


Result Diagrams: 


                                 10/01/18 15:37





                                 10/01/18 15:37





- ECG


O2 Sat by Pulse Oximetry: 96 (RA)


Pulse Ox Interpretation: Normal





Medical Decision Making


Medical Decision Making: 


Time: 1519


A/P: Workup for acute pain exacerbation with arthritis of the hips. 


-- XRs and basic labs ordered


-- Naproxen for pain


-- Will reassess patient





-- VBG


-- EKG


-- BMP


-- CBC with differentials


-- Naproxen 500 mg PO


-- Hip MIN 3V w/ Pelvis Brad XR





Time: 1536


--  now at bedside and states patient's mental status is deteriorating. 

 is concerned of patient being at home. 


-- Head CT ordered. Will consult with PMD.





Time: 1734


-- Brain CT shows no acute changes. Bilateral hip XRs only show chronic 

arthritis. Patient to be discharge home with ambulance since patient lives on 

the fifth floor walkup. Patient and daughter to call Dr. Costomiris in the 

morning to be seen and evaluated by him tomorrow or on Thursday. Daughter and 

patient agree with plan. Patient given a prescription of naproxen. Patient given

 strict instruction to not take more than prescribed as it can cause GI and 

kidney problems. 





 

________________________________________________________________________________


____


Scribe Attestation:


Documented by Fay Morales acting as a scribe for Alicia Perez MD.





Provider Scribe Attestation:


All medical record entries made by the Scribe were at my direction and 

personally dictated by me. I have reviewed the chart and agree that the record 

accurately reflects my personal performance of the history, physical exam, 

medical decision making, and the department course for this patient. I have also

 personally directed, reviewed, and agree with the discharge instructions and 

disposition.





Disposition





- Clinical Impression


Clinical Impression: 


 Hip pain, Chronic hip pain








- Patient ED Disposition


Is Patient to be Admitted: No


Counseled Patient/Family Regarding: Studies Performed, Diagnosis, Need For 

Followup, Rx Given





- Disposition


Disposition: Routine/Home


Disposition Time: 17:34


Condition: IMPROVED


Additional Instructions: 


Take Naproxen as prescribed but do not take more than twice per day.  Follow up 

with primary medical doctor for further evaluation of kidneys as this can cause 

kidney problems.  Discuss the need for long term pain control as well as 

management of dementia and assistance with home activities.  Return to the 

emergency department if symptoms worsen or if new symptoms develop.


Prescriptions: 


Naproxen 500 mg PO BID #14 tab


Instructions:  Hip Pain (DC)


Forms:  [employer] (English)


Print Language: ENGLISH

## 2018-10-01 NOTE — CT
Date of service: 



10/01/2018



PROCEDURE:  CT HEAD WITHOUT CONTRAST.



HISTORY:

ams



COMPARISON:

04/13/2018.



TECHNIQUE:

Axial computed tomography images were obtained through the head/brain 

without intravenous contrast.  



Supplemental Coronal and Sagittal projections created and reviewed.



Radiation dose:



Total exam DLP = 895.61 mGy-cm.



This CT exam was performed using one or more of the following dose 

reduction techniques: Automated exposure control, adjustment of the 

mA and/or kV according to patient size, and/or use of iterative 

reconstruction technique.



FINDINGS:



HEMORRHAGE:

No intracranial hemorrhage. 



BRAIN:

No mass effect or edema.  Cortical atrophy and chronic microvascular 

ischemic change. 



VENTRICLES:

Unremarkable. No hydrocephalus. 



CALVARIUM:

Unremarkable.



PARANASAL SINUSES:

Unremarkable as visualized. No significant inflammatory changes.



MASTOID AIR CELLS:

Unremarkable as visualized. No inflammatory changes.



OTHER FINDINGS:

None.



IMPRESSION:

No acute intracranial abnormalities. No significant findings to 

account for the clinical presentation. No significant interval change 

compared to the prior examination(s).

## 2018-10-01 NOTE — RAD
Date of service: 



10/01/2018



PROCEDURE:  Pelvis bilateral hips.



HISTORY:

Worsening of hip pain.  Unable to ambulate 



COMPARISON:

None



TECHNIQUE:

Standard protocol for this study/examination.



FINDINGS:

There are no osseous abnormalities to suggest fracture. The pelvic 

ring is intact. Preserved femoral-acetabular relationship. Negative 

study for protrusio, subluxation or dislocation.  Degenerative 

changes: Moderate and symmetrical. 



Fecal impaction/constipation noted. 



IMPRESSION:

No acute findings related to/accounting  for the clinical 

presentation. Additional benign and/or incidental findings described 

above.

## 2018-10-02 NOTE — CARD
--------------- APPROVED REPORT --------------





Date of service: 10/01/2018



EKG Measurement

Heart [id number]

[id number]

[id number]

[id number]



<Conclusion>

Atrial fibrillation

Left axis deviation

Moderate voltage criteria for LVH, may be normal variant

Abnormal ECG

## 2019-05-18 ENCOUNTER — HOSPITAL ENCOUNTER (INPATIENT)
Dept: HOSPITAL 14 - H.ER | Age: 84
LOS: 16 days | Discharge: HOME HEALTH | DRG: 193 | End: 2019-06-03
Attending: INTERNAL MEDICINE | Admitting: INTERNAL MEDICINE
Payer: MEDICARE

## 2019-05-18 VITALS — BODY MASS INDEX: 28.2 KG/M2

## 2019-05-18 DIAGNOSIS — J44.0: ICD-10-CM

## 2019-05-18 DIAGNOSIS — R78.81: ICD-10-CM

## 2019-05-18 DIAGNOSIS — M16.0: ICD-10-CM

## 2019-05-18 DIAGNOSIS — I48.2: ICD-10-CM

## 2019-05-18 DIAGNOSIS — G92: ICD-10-CM

## 2019-05-18 DIAGNOSIS — I10: ICD-10-CM

## 2019-05-18 DIAGNOSIS — J15.9: Primary | ICD-10-CM

## 2019-05-18 DIAGNOSIS — Z87.442: ICD-10-CM

## 2019-05-18 DIAGNOSIS — M17.0: ICD-10-CM

## 2019-05-18 DIAGNOSIS — Z87.01: ICD-10-CM

## 2019-05-18 DIAGNOSIS — B96.20: ICD-10-CM

## 2019-05-18 DIAGNOSIS — Z88.0: ICD-10-CM

## 2019-05-18 DIAGNOSIS — Z14.01: ICD-10-CM

## 2019-05-18 DIAGNOSIS — F01.51: ICD-10-CM

## 2019-05-18 DIAGNOSIS — I25.2: ICD-10-CM

## 2019-05-18 DIAGNOSIS — Z79.01: ICD-10-CM

## 2019-05-18 DIAGNOSIS — Z87.891: ICD-10-CM

## 2019-05-18 LAB
ALBUMIN SERPL-MCNC: 4 G/DL (ref 3.5–5)
ALBUMIN/GLOB SERPL: 1.2 {RATIO} (ref 1–2.1)
ALT SERPL-CCNC: 20 U/L (ref 9–52)
ANISOCYTOSIS BLD QL SMEAR: SLIGHT
AST SERPL-CCNC: 34 U/L (ref 14–36)
BACTERIA #/AREA URNS HPF: (no result) /[HPF]
BASE EXCESS BLDV CALC-SCNC: 2 MMOL/L (ref 0–2)
BASE EXCESS BLDV CALC-SCNC: 4.2 MMOL/L (ref 0–2)
BASOPHILS # BLD AUTO: 0 K/UL (ref 0–0.2)
BASOPHILS NFR BLD: 0.3 % (ref 0–2)
BILIRUB UR-MCNC: NEGATIVE MG/DL
BUN SERPL-MCNC: 17 MG/DL (ref 7–17)
CALCIUM SERPL-MCNC: 8.9 MG/DL (ref 8.4–10.2)
COLOR UR: YELLOW
EOSINOPHIL # BLD AUTO: 0.1 K/UL (ref 0–0.7)
EOSINOPHIL NFR BLD: 1.8 % (ref 0–4)
EOSINOPHIL NFR BLD: 2 % (ref 0–7)
ERYTHROCYTE [DISTWIDTH] IN BLOOD BY AUTOMATED COUNT: 14.2 % (ref 11.5–14.5)
GFR NON-AFRICAN AMERICAN: > 60
GLUCOSE UR STRIP-MCNC: (no result) MG/DL
HGB BLD-MCNC: 16.1 G/DL (ref 12–16)
INR PPP: 1.1
LEUKOCYTE ESTERASE UR-ACNC: (no result) LEU/UL
LG PLATELETS BLD QL SMEAR: PRESENT
LYMPHOCYTE: 11 % (ref 20–50)
LYMPHOCYTES # BLD AUTO: 0.5 K/UL (ref 1–4.3)
LYMPHOCYTES NFR BLD AUTO: 7.6 % (ref 20–40)
MCH RBC QN AUTO: 29.4 PG (ref 27–31)
MCHC RBC AUTO-ENTMCNC: 33.3 G/DL (ref 33–37)
MCV RBC AUTO: 88.1 FL (ref 81–99)
MONOCYTE: 1 % (ref 0–10)
MONOCYTES # BLD: 0 K/UL (ref 0–0.8)
MONOCYTES NFR BLD: 0.6 % (ref 0–10)
NEUTROPHILS # BLD: 6.4 K/UL (ref 1.8–7)
NEUTROPHILS NFR BLD AUTO: 82 % (ref 42–75)
NEUTROPHILS NFR BLD AUTO: 89.7 % (ref 50–75)
NEUTS BAND NFR BLD: 4 % (ref 0–2)
NRBC BLD AUTO-RTO: 0.1 % (ref 0–0)
PCO2 BLDV: 35 MMHG (ref 40–60)
PCO2 BLDV: 59 MMHG (ref 40–60)
PH BLDV: 7.31 [PH] (ref 7.32–7.43)
PH BLDV: 7.5 [PH] (ref 7.32–7.43)
PH UR STRIP: 6 [PH] (ref 5–8)
PLATELET # BLD EST: NORMAL 10*3/UL
PLATELET # BLD: 216 K/UL (ref 130–400)
PMV BLD AUTO: 8.7 FL (ref 7.2–11.7)
PROT UR STRIP-MCNC: 30 MG/DL
PROTHROMBIN TIME: 12.2 SECONDS (ref 9.8–13.1)
RBC # BLD AUTO: 5.47 MIL/UL (ref 3.8–5.2)
RBC # UR STRIP: (no result) /UL
SP GR UR STRIP: 1.01 (ref 1–1.03)
SQUAMOUS EPITHIAL: 8 /HPF (ref 0–5)
TOTAL CELLS COUNTED BLD: 100
URINE AMORPHOUS SEDIMENT: (no result) /UL
URINE CLARITY: (no result)
UROBILINOGEN UR-MCNC: (no result) MG/DL (ref 0.2–1)
VENOUS BLOOD FIO2: 21 %
VENOUS BLOOD FIO2: 21 %
VENOUS BLOOD GAS PO2: 19 MM/HG (ref 30–55)
VENOUS BLOOD GAS PO2: 38 MM/HG (ref 30–55)
WBC # BLD AUTO: 7.2 K/UL (ref 4.8–10.8)

## 2019-05-18 RX ADMIN — ENOXAPARIN SODIUM SCH MG: 80 INJECTION SUBCUTANEOUS at 20:12

## 2019-05-18 RX ADMIN — CLINDAMYCIN IN 5 PERCENT DEXTROSE SCH MLS/HR: 12 INJECTION, SOLUTION INTRAVENOUS at 20:28

## 2019-05-18 NOTE — RAD
Date of service: 



05/18/2019



HISTORY:

Cough



COMPARISON:

No prior. 



FINDINGS:



LUNGS:

Left lower lobe infiltrate retrocardiac location.  This represents a 

new finding.



PLEURA:

No significant pleural effusion identified, no pneumothorax apparent.



CARDIOVASCULAR:

Atherosclerotic calcifications identified primarily aortic arch.



 No radiographic findings to suggest acute or significant 

cardiovascular disease.



OSSEOUS STRUCTURES:

No significant abnormalities.



VISUALIZED UPPER ABDOMEN:

Normal.



OTHER FINDINGS:

None.



IMPRESSION:

New left lower lobe/retrocardiac infiltrate likely pneumonia.

## 2019-05-18 NOTE — CP.PCM.PN
Subjective





- Date & Time of Evaluation


Date of Evaluation: 05/18/19


Time of Evaluation: 16:45





- Subjective


Subjective: 





I D NOTE


PATIENT c lll pneumonia


allergy to pcn


have started clindamycin /azactam





Objective





- Vital Signs/Intake and Output


Vital Signs (last 24 hours): 


                                        











Temp Pulse Resp BP Pulse Ox


 


 97.8 F   111 H  20   132/81   95 


 


 05/18/19 09:59  05/18/19 15:00  05/18/19 15:00  05/18/19 15:00  05/18/19 15:50











- Medications


Medications: 


                               Current Medications





Enoxaparin Sodium (Lovenox)  80 mg SC Q12 ANITA; Protocol


Clindamycin Phosphate 600 mg/ (Sodium Chloride)  54 mls @ 54 mls/hr IVPB Q12 

ANITA; Protocol


Aztreonam 1 gm/ Sodium (Chloride)  100 mls @ 100 mls/hr IVPB Q12 ANITA; Protocol


Metoprolol Tartrate (Lopressor)  100 mg PO BID ANITA











- Labs


Labs: 


                                        





                                 05/18/19 10:23 





                                 05/18/19 14:01 





                                        











PT  12.2 Seconds (9.8-13.1)   05/18/19  11:14    


 


INR  1.1   05/18/19  11:14

## 2019-05-18 NOTE — ED PDOC
HPI: Chest Pain


Time Seen by Provider: 05/18/19 09:50


Chief Complaint (Nursing): Chest Pain


Chief Complaint (Provider): Chest Pain


History Per: Patient, EMS


History/Exam Limitations: no limitations


Onset/Duration Of Symptoms: Sudden Onset


Current Symptoms Are (Timing): Still Present


Additional Complaint(s): 


86 year old female with medical history of COPD, HTN, and Afib, arrives to the 

emergency department via EMS after her  called 911, for an evaluation of 

chest pain, shaking, and chills since this morning. EMS reports patient had 

shortness of breath upon their arrival. Otherwise, she denies any fever, cough, 

leg pain or swelling.





Past Medical History


Reviewed: Historical Data, Nursing Documentation, Vital Signs


Vital Signs: 





                                Last Vital Signs











Temp  97.8 F   05/18/19 09:59


 


Pulse  109 H  05/18/19 09:59


 


Resp  22   05/18/19 09:59


 


BP  170/105 H  05/18/19 09:59


 


Pulse Ox  91 L  05/18/19 09:59











Primary Care Provider: Dane Guevara





- Medical History


PMH: Arthritis, Bronchitis (Chronic), COPD, Gall Bladder Disease, HTN, Kidney 

Stones, Pneumonia, Chronic Kidney Disease





- Family History


Family History: States: Unknown Family Hx





- Home Medications


Home Medications: 


                                Ambulatory Orders











 Medication  Instructions  Recorded


 


ALPRAZolam [Xanax] 1 mg PO DAILY 07/10/17


 


Diltiazem HCl [Cardizem] 60 mg PO QID 07/10/17


 


Dorzolamide HCl 1 drop OS BID 07/10/17


 


Latanoprost 0.005% Opht [Xalatan 1 drop OU HS 07/10/17





Opht]  


 


Metoprolol Tartrate 100 mg PO BID 07/10/17


 


Timolol [Betimol] 1 drop OS BID 07/10/17


 


oxyCODONE/Acetaminophen [Percocet 1 tab PO PRN PRN 07/10/17





5/325 mg Tab]  


 


Ciprofloxacin [Cipro] 500 mg PO Q12 #10 tab 07/14/17


 


Meclizine [Meclizine*] 25 mg PO Q8 #15 tab 04/13/18


 


Naproxen 500 mg PO BID #14 tab 10/01/18














- Allergies


Allergies/Adverse Reactions: 


                                    Allergies











Allergy/AdvReac Type Severity Reaction Status Date / Time


 


Penicillins Allergy  RASH Verified 05/18/19 09:44














Review of Systems


ROS Statement: Except As Marked, All Systems Reviewed And Found Negative


Constitutional: Positive for: Chills.  Negative for: Fever


Cardiovascular: Positive for: Chest Pain


Respiratory: Positive for: Shortness of Breath.  Negative for: Cough


Musculoskeletal: Negative for: Leg Pain (or swelling)





Physical Exam





- Reviewed


Nursing Documentation Reviewed: Yes


Vital Signs Reviewed: Yes





- Physical Exam


Appears: Positive for: No Acute Distress, Uncomfortable


Head Exam: Positive for: ATRAUMATIC, NORMAL INSPECTION, NORMOCEPHALIC


Skin: Positive for: Normal Color


Eye Exam: Positive for: Normal appearance, EOMI, PERRL


ENT: Positive for: Normal ENT Inspection.  Negative for: Pharyngeal Erythema


Neck: Positive for: Normal, Painless ROM, Supple


Cardiovascular/Chest: Positive for: Tachycardia, Irregularly Irregular


Respiratory: Positive for: Decreased Breath Sounds, Rhonchi.  Negative for: Res

piratory Distress


Gastrointestinal/Abdominal: Positive for: Normal Exam, Soft.  Negative for: 

Tenderness


Extremity: Positive for: Normal ROM (upper/lower).  Negative for: Pedal Edema, 

Calf Tenderness


Neurological/Psych: Positive for: Awake, Alert, Oriented.  Negative for: 

Motor/Sensory Deficits





- Laboratory Results


Result Diagrams: 


                                 05/18/19 10:23








- ECG


O2 Sat by Pulse Oximetry: 91 (RA)


Pulse Ox Interpretation: Normal





- Critical Care


Total Time (In Min): 30


Documented Critical Care: Time excludes all time spent performint seperately 

billable procedures





Medical Decision Making


Medical Decision Making: 


Time: 0956


Initial Plan: patient appears in Afib with ventricular response at 110-120 BPM. 

Treat with Cardizem and Duoneb INH for COPD exacerbation


* Labs including UA


* EKG


* CXR


* IV fluids


* Blood/urine culture


LLL infiltrate but does not meet criteria for sepsis, Lactate elevated. Will Tx 

with Vancomycin. Also has a fib with RVR, responded to IV cardizem. Will 

anticoagulate


------

--------------------------------------------------------------------------------


---------------------------------


Scribe Attestation:


Documented by Carol Aragon, acting as a scribe for Walter Torres MD.


Provider Scribe Attestation:


All medical record entries made by the Scribe were at my direction and 

personally dictated by me. I have reviewed the chart and agree that the record 

accurately reflects my personal performance of the history, physical exam, 

medical decision making, and the department course for this patient. I have also

 personally directed, reviewed, and agree with the discharge instructions and 

disposition.





Disposition





- Clinical Impression


Clinical Impression: 


 Pneumonia, Atrial fibrillation with RVR








- Patient ED Disposition


Is Patient to be Admitted: Yes





- Disposition


Disposition Time: 11:29


Condition: FAIR


Forms:  [facility] (English)





- Pt Status Changed To:


Hospital Disposition Of: Inpatient





- Admit Certification


Admit to Inpatient:: After my assessment, the patient will require h

ospitalization for at least two midnights.  This is because of the severity of 

symptoms shown, intensity of services needed, and/or the medical risk in this 

patient being treated as an outpatient.





- POA


Present On Arrival: None

## 2019-05-19 LAB
ALBUMIN SERPL-MCNC: 4 G/DL (ref 3.5–5)
ALBUMIN/GLOB SERPL: 1.2 {RATIO} (ref 1–2.1)
ALT SERPL-CCNC: 19 U/L (ref 9–52)
AST SERPL-CCNC: 25 U/L (ref 14–36)
BUN SERPL-MCNC: 19 MG/DL (ref 7–17)
CALCIUM SERPL-MCNC: 8.8 MG/DL (ref 8.4–10.2)
ERYTHROCYTE [DISTWIDTH] IN BLOOD BY AUTOMATED COUNT: 14.5 % (ref 11.5–14.5)
GFR NON-AFRICAN AMERICAN: > 60
HGB BLD-MCNC: 15.4 G/DL (ref 12–16)
MCH RBC QN AUTO: 28.7 PG (ref 27–31)
MCHC RBC AUTO-ENTMCNC: 32.9 G/DL (ref 33–37)
MCV RBC AUTO: 87.2 FL (ref 81–99)
PLATELET # BLD: 147 K/UL (ref 130–400)
RBC # BLD AUTO: 5.39 MIL/UL (ref 3.8–5.2)
WBC # BLD AUTO: 17 K/UL (ref 4.8–10.8)

## 2019-05-19 RX ADMIN — ENOXAPARIN SODIUM SCH MG: 80 INJECTION SUBCUTANEOUS at 08:55

## 2019-05-19 RX ADMIN — ENOXAPARIN SODIUM SCH MG: 80 INJECTION SUBCUTANEOUS at 20:32

## 2019-05-19 RX ADMIN — CLINDAMYCIN IN 5 PERCENT DEXTROSE SCH MLS/HR: 12 INJECTION, SOLUTION INTRAVENOUS at 20:29

## 2019-05-19 RX ADMIN — CLINDAMYCIN IN 5 PERCENT DEXTROSE SCH MLS/HR: 12 INJECTION, SOLUTION INTRAVENOUS at 08:56

## 2019-05-19 NOTE — CP.PCM.PN
Subjective





- Date & Time of Evaluation


Date of Evaluation: 05/19/19


Time of Evaluation: 11:06





- Subjective


Subjective: 





I D NOTE


PATIENT c POSITIVE BLOOD CULTURES 


FOR GRAM NEGATIVE RODS


WBC:17,82% POLYS ,4% BANDS


HAVE ADDED AMIKACIN TO RX 


AS PATIENT IS ALLERGIC TO PCN


SO FAR IS  AFEBRILE TODAY


CONTINUE AZACTAM/CLINDAMYCIN





Objective





- Vital Signs/Intake and Output


Vital Signs (last 24 hours): 


                                        











Temp Pulse Resp BP Pulse Ox


 


 97.6 F   91 H  20   125/65   94 L


 


 05/19/19 08:18  05/19/19 08:57  05/19/19 08:18  05/19/19 08:57  05/19/19 08:18








Intake and Output: 


                                        











 05/19/19 05/19/19





 06:59 18:59


 


Intake Total 250 


 


Output Total 500 


 


Balance -250 














- Medications


Medications: 


                               Current Medications





Acetaminophen (Tylenol 325mg Tab)  325 mg PO Q6 PRN


   PRN Reason: Pain, moderate (4-7)


   Last Admin: 05/18/19 22:48 Dose:  325 mg


Albuterol/Ipratropium (Duoneb 3 Mg/0.5 Mg (3 Ml) Ud)  3 ml INH RQ4 PRN


   PRN Reason: Shortness of Breath


   Last Admin: 05/18/19 21:05 Dose:  3 ml


Enoxaparin Sodium (Lovenox)  80 mg SC Q12 ANITA; Protocol


   Last Admin: 05/19/19 08:55 Dose:  80 mg


Clindamycin Phosphate (Cleocin 600mg/50ml D5w)  600 mg in 50 mls @ 50 mls/hr 

IVPB Q12 ANITA; Protocol


   Last Admin: 05/19/19 08:56 Dose:  50 mls/hr


Aztreonam 1 gm/ Sodium (Chloride)  100 mls @ 100 mls/hr IVPB Q12 ANITA; Protocol


   Last Admin: 05/19/19 08:55 Dose:  100 mls/hr


Amikacin Sulfate 250 mg/ (Sodium Chloride)  251 mls @ 250 mls/hr IVPB Q24H ANITA; 

Protocol


Metoprolol Tartrate (Lopressor)  100 mg PO BID ANITA


   Last Admin: 05/19/19 08:57 Dose:  100 mg











- Labs


Labs: 


                                        





                                 05/19/19 04:05 





                                 05/19/19 04:05 





                                        











PT  12.2 Seconds (9.8-13.1)   05/18/19  11:14    


 


INR  1.1   05/18/19  11:14

## 2019-05-19 NOTE — CARD
--------------- APPROVED REPORT --------------





Date of service: 05/18/2019



EKG Measurement

Heart [id number]

[id number]

[id number]

[id number]



<Conclusion>

Atrial fibrillation with rapid ventricular response

Left axis deviation

Moderate voltage criteria for LVH, may be normal variant

Nonspecific ST-T changes

Abnormal ECG

## 2019-05-19 NOTE — CON
DATE:  05/19/2019



INFECTIOUS DISEASE CONSULTATION



HISTORY OF PRESENT ILLNESS:  The patient was seen in the intensive care

unit on 05/18/2019 which was yesterday.  She is an 86-year-old female with

medical history of COPD, hypertension, and atrial fibrillation.  She came

to the emergency room with EMS for evaluation of chest pain and shaking

chill since early this morning.  Emergency medical services reports that

she had shortness of breath upon arrival.  Otherwise, there was no history

of fever or cough.  The patient has no history of fever that she knows or

the  knows.  The patient lives in a fifth story walkup and climbs 58

steps daily according to history.



SOCIAL HISTORY:  No history of smoking and no history of alcoholism.



PAST MEDICAL HISTORY:  Chronic history of bronchitis, COPD and she also has

a history of pneumonia.



PHYSICAL EXAMINATION:

GENERAL:  The patient is alert but a bit lethargic.

HEENT:  Within normal limits.

NECK:  Supple.

LUNGS:  Decreased breath sounds bilaterally, left base somewhat less than

right base.

HEART:  Irregular.

ABDOMEN:  Soft.  Positive bowel sounds.

EXTREMITIES:  She has trace peripheral edema.



LABORATORY DATA:  Microbiology as of yesterday was pending.  WBC 7.2 on

05/18/2019 and dictated today on 05/19/2019 is 17.  Creatinine is 0.6.  GFR

is greater than 60.  Troponin is less than 0.012.  Urine shows occasional

bacteria, rbc's 78.  The patient on chest x-ray was found to have left

lower lobe infiltrates.



DIAGNOSES:  Today, she has pneumonia, also chronic obstructive pulmonary

disease, atrial fibrillation.



PLAN:  The patient is started on clindamycin and Azactam.  We will follow

the patient awaiting labs for followup labs and also awaiting culture

results.







__________________________________________

Andrew Souza MD





DD:  05/19/2019 11:52:57

DT:  05/19/2019 12:47:23

Job # [id number] Forms completed. Scanned into completed-signed folder.

## 2019-05-19 NOTE — CP.PCM.HP
History of Present Illness





- History of Present Illness


History of Present Illness: 





                    86-year-old female was brought to the emergency room by her 

 following a bout of shaking chills.  She has a long history of COPD as a

consequence of chronic cigarette use which she quit in 1986.  The patient has 

had atrial fibrillation and has been taking metoprolol for rate control.  She 

has a history of hypertension.  She denies any history of diabetes.  Has never 

suffered a myocardial infarction or congestive cardiac failure.  She has a 

history of severe osteoarthritic pains in her knees and hips.  She lives on the 

fifth floor walk up and climbs 58 steps to get to her apartment every time she 

goes home.  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  She indicates that

she has a bleeding tendency as manifested by prolonged bleeding following tooth 

extractions on multiple occasions.


Physical examination shows an elderly lady who is quite alert awake coherent 

afebrile and breathes at approximately 18 breaths/min and can carry on a 

conversation.  Her heart rate was 100 bpm irregularly irregular.  Her blood 

pressure was 136/74 mmHg.  Her jugular venous pressure was not elevated there 

was no pitting edema over her lower extremities.  The pedal pulses were feeble 

but distinctly present.  There were no carotid bruits.  Thyroid and breast did 

not reveal anything abnormal.  The apex was not palpable the first and second 

heart sounds were distant but normal.  There was no gallop there were no rales. 

There were scattered crepitations at both bases.  Abdomen was soft liver and 

spleen were not palpable.


Her electrocardiogram showed atrial fibrillation with a pattern of left 

ventricular hypertrophy and left axis deviation.  There were no Q waves 

suggestive of prior myocardial infarct.  Her lab data shows that following 

hydration she has developed leukocytosis.  Her hemoglobin and hematocrit were 

normal.  Her BUN/creatinine were normal.  Her serum potassium this morning was 

3.5 mg/L and I have ordered potassium replenishment.


Chest x-ray was noted.


Blood culture drawn yesterday has identified gram-negative rods.








Patient: Community-acquired pneumonia in a patient with COPD and evidence of 

bacteremia.  Chronic atrial fibrillation.  Hypertension.  Osteoarthritis of 

knees and hips.





The patient has been seen by an infectious disease specialist.  Her heart rate 

has been well controlled and she is hemodynamically stable.  She will be 

transferred out of intensive care unit.





Present on Admission





- Present on Admission


Any Indicators Present on Admission: No





Past Patient History





- Past Medical History & Family History


Past Medical History?: Yes





- Past Social History


Smoking Status: Never Smoked





- CARDIAC


Hx Cardiac Disorders: Yes





- PULMONARY


Hx Bronchitis: Yes (Chronic)


Hx Chronic Obstructive Pulmonary Disease (COPD): Yes


Hx Pneumonia: Yes





- NEUROLOGICAL


Hx Neurological Disorder: Yes


Hx Dizziness: Yes (LIGHTHEADEDNESS)





- HEENT


Hx HEENT Problems: Yes


Hx Cataracts: Yes





- RENAL


Hx Chronic Kidney Disease: Yes





- ENDOCRINE/METABOLIC


Hx Endocrine Disorders: No





- HEMATOLOGICAL/ONCOLOGICAL


Hx AIDS: No


Hx Human Immunodeficiency Virus (HIV): No





- MUSCULOSKELETAL/RHEUMATOLOGICAL


Hx Falls: No





- GASTROINTESTINAL


Hx Gall Bladder Disease: Yes





- GENITOURINARY/GYNECOLOGICAL


Hx Genitourinary Disorders: Yes


Hx Hematuria: Yes





- PSYCHIATRIC


Hx Substance Use: No





- SURGICAL HISTORY


Hx Surgeries: Yes


Hx Cataract Extraction: Yes


Other/Comment: Ovarian cyst





- ANESTHESIA


Hx Anesthesia: Yes


Hx Anesthesia Reactions: No





Meds


Allergies/Adverse Reactions: 


                                    Allergies











Allergy/AdvReac Type Severity Reaction Status Date / Time


 


Penicillins Allergy  RASH Verified 05/18/19 09:44














Results





- Vital Signs


Recent Vital Signs: 





                                Last Vital Signs











Temp  97.6 F   05/19/19 08:18


 


Pulse  91 H  05/19/19 08:57


 


Resp  20   05/19/19 08:18


 


BP  125/65   05/19/19 08:57


 


Pulse Ox  94 L  05/19/19 08:18














- Labs


Result Diagrams: 


                                 05/19/19 04:05





                                 05/19/19 04:05


Labs: 





                         Laboratory Results - last 24 hr











  05/18/19 05/18/19 05/18/19





  10:23 10:23 10:27


 


WBC  7.2  


 


RBC  5.47 H  


 


Hgb  16.1 H  


 


Hct  48.2 H  


 


MCV  88.1  


 


MCH  29.4  


 


MCHC  33.3  


 


RDW  14.2  


 


Plt Count  216  


 


MPV  8.7  


 


Neut % (Auto)  89.7 H  


 


Lymph % (Auto)  7.6 L  


 


Mono % (Auto)  0.6  


 


Eos % (Auto)  1.8  


 


Baso % (Auto)  0.3  


 


Neut # (Auto)  6.4  


 


Lymph # (Auto)  0.5 L  


 


Mono # (Auto)  0.0  


 


Eos # (Auto)  0.1  


 


Baso # (Auto)  0.0  


 


Neutrophils % (Manual)  82 H  


 


Band Neutrophils %  4 H  


 


Lymphocytes % (Manual)  11 L  


 


Monocytes % (Manual)  1  


 


Eosinophils % (Manual)  2  


 


Platelet Estimate  Normal  


 


Large Platelets  Present  


 


Anisocytosis (manual)  Slight  


 


PT   


 


INR   


 


pO2    19 L


 


VBG pH    7.31 L


 


VBG pCO2    59


 


VBG HCO3    24.5


 


VBG Total CO2    31.5 H


 


VBG O2 Sat (Calc)    28.2 L


 


VBG Base Excess    2.0


 


VBG Potassium    4.4


 


Sodium    138.0


 


Chloride    103.0


 


Glucose    105


 


Lactate    2.6 H


 


FiO2    21.0


 


Potassium   


 


Carbon Dioxide   


 


Anion Gap   


 


BUN   


 


Creatinine   


 


Est GFR ( Amer)   


 


Est GFR (Non-Af Amer)   


 


Random Glucose   


 


Calcium   


 


Total Bilirubin   


 


AST   


 


ALT   


 


Alkaline Phosphatase   


 


Troponin I   < 0.0120 


 


Total Protein   


 


Albumin   


 


Globulin   


 


Albumin/Globulin Ratio   


 


Venous Blood Potassium    4.4


 


Urine Color   


 


Urine Clarity   


 


Urine pH   


 


Ur Specific Gravity   


 


Urine Protein   


 


Urine Glucose (UA)   


 


Urine Ketones   


 


Urine Blood   


 


Urine Nitrate   


 


Urine Bilirubin   


 


Urine Urobilinogen   


 


Ur Leukocyte Esterase   


 


Urine RBC (Auto)   


 


Urine Microscopic WBC   


 


Ur Squamous Epith Cells   


 


Amorphous Sediment   


 


Urine Bacteria   














  05/18/19 05/18/19 05/18/19





  11:14 11:56 14:01


 


WBC   


 


RBC   


 


Hgb   


 


Hct   


 


MCV   


 


MCH   


 


MCHC   


 


RDW   


 


Plt Count   


 


MPV   


 


Neut % (Auto)   


 


Lymph % (Auto)   


 


Mono % (Auto)   


 


Eos % (Auto)   


 


Baso % (Auto)   


 


Neut # (Auto)   


 


Lymph # (Auto)   


 


Mono # (Auto)   


 


Eos # (Auto)   


 


Baso # (Auto)   


 


Neutrophils % (Manual)   


 


Band Neutrophils %   


 


Lymphocytes % (Manual)   


 


Monocytes % (Manual)   


 


Eosinophils % (Manual)   


 


Platelet Estimate   


 


Large Platelets   


 


Anisocytosis (manual)   


 


PT  12.2  


 


INR  1.1  


 


pO2   


 


VBG pH   


 


VBG pCO2   


 


VBG HCO3   


 


VBG Total CO2   


 


VBG O2 Sat (Calc)   


 


VBG Base Excess   


 


VBG Potassium   


 


Sodium    139


 


Chloride    104


 


Glucose   


 


Lactate   


 


FiO2   


 


Potassium    3.9


 


Carbon Dioxide    25


 


Anion Gap    14


 


BUN    17


 


Creatinine    0.6 L


 


Est GFR ( Amer)    > 60


 


Est GFR (Non-Af Amer)    > 60


 


Random Glucose    109 H


 


Calcium    8.9


 


Total Bilirubin    1.3


 


AST    34


 


ALT    20


 


Alkaline Phosphatase    57


 


Troponin I   


 


Total Protein    7.4


 


Albumin    4.0


 


Globulin    3.4


 


Albumin/Globulin Ratio    1.2


 


Venous Blood Potassium   


 


Urine Color   Yellow 


 


Urine Clarity   Turbid 


 


Urine pH   6.0 


 


Ur Specific Gravity   1.009 


 


Urine Protein   30 


 


Urine Glucose (UA)   Neg 


 


Urine Ketones   Negative 


 


Urine Blood   Moderate 


 


Urine Nitrate   Negative 


 


Urine Bilirubin   Negative 


 


Urine Urobilinogen   0.2-1.0 


 


Ur Leukocyte Esterase   Trace 


 


Urine RBC (Auto)   78 H 


 


Urine Microscopic WBC   9 H 


 


Ur Squamous Epith Cells   8 H 


 


Amorphous Sediment   Rare H 


 


Urine Bacteria   Occ H 














  05/18/19 05/19/19 05/19/19





  14:13 04:05 04:05


 


WBC   17.0 H D 


 


RBC   5.39 H 


 


Hgb   15.4 


 


Hct   47.0 


 


MCV   87.2 


 


MCH   28.7 


 


MCHC   32.9 L 


 


RDW   14.5 


 


Plt Count   147 


 


MPV   


 


Neut % (Auto)   


 


Lymph % (Auto)   


 


Mono % (Auto)   


 


Eos % (Auto)   


 


Baso % (Auto)   


 


Neut # (Auto)   


 


Lymph # (Auto)   


 


Mono # (Auto)   


 


Eos # (Auto)   


 


Baso # (Auto)   


 


Neutrophils % (Manual)   


 


Band Neutrophils %   


 


Lymphocytes % (Manual)   


 


Monocytes % (Manual)   


 


Eosinophils % (Manual)   


 


Platelet Estimate   


 


Large Platelets   


 


Anisocytosis (manual)   


 


PT   


 


INR   


 


pO2  38  


 


VBG pH  7.50 H  


 


VBG pCO2  35 L  


 


VBG HCO3  27.7  


 


VBG Total CO2  28.4 H  


 


VBG O2 Sat (Calc)  83.4 H  


 


VBG Base Excess  4.2 H  


 


VBG Potassium  3.3 L  


 


Sodium  140.0   141


 


Chloride  107.0   104


 


Glucose  110 H  


 


Lactate  1.8  


 


FiO2  21.0  


 


Potassium    3.5 L


 


Carbon Dioxide    26


 


Anion Gap    15


 


BUN    19 H


 


Creatinine    0.7


 


Est GFR ( Amer)    > 60


 


Est GFR (Non-Af Amer)    > 60


 


Random Glucose    159 H


 


Calcium    8.8


 


Total Bilirubin    1.0


 


AST    25


 


ALT    19


 


Alkaline Phosphatase    59


 


Troponin I   


 


Total Protein    7.2


 


Albumin    4.0


 


Globulin    3.3


 


Albumin/Globulin Ratio    1.2


 


Venous Blood Potassium  3.3 L  


 


Urine Color   


 


Urine Clarity   


 


Urine pH   


 


Ur Specific Gravity   


 


Urine Protein   


 


Urine Glucose (UA)   


 


Urine Ketones   


 


Urine Blood   


 


Urine Nitrate   


 


Urine Bilirubin   


 


Urine Urobilinogen   


 


Ur Leukocyte Esterase   


 


Urine RBC (Auto)   


 


Urine Microscopic WBC   


 


Ur Squamous Epith Cells   


 


Amorphous Sediment   


 


Urine Bacteria

## 2019-05-20 LAB
BUN SERPL-MCNC: 19 MG/DL (ref 7–17)
CALCIUM SERPL-MCNC: 8.8 MG/DL (ref 8.4–10.2)
ERYTHROCYTE [DISTWIDTH] IN BLOOD BY AUTOMATED COUNT: 15 % (ref 11.5–14.5)
GFR NON-AFRICAN AMERICAN: > 60
HGB BLD-MCNC: 15.7 G/DL (ref 12–16)
INR PPP: 1
MCH RBC QN AUTO: 28.8 PG (ref 27–31)
MCHC RBC AUTO-ENTMCNC: 33.2 G/DL (ref 33–37)
MCV RBC AUTO: 87 FL (ref 81–99)
PLATELET # BLD: 179 K/UL (ref 130–400)
PROTHROMBIN TIME: 11.9 SECONDS (ref 9.8–13.1)
RBC # BLD AUTO: 5.43 MIL/UL (ref 3.8–5.2)
WBC # BLD AUTO: 17.9 K/UL (ref 4.8–10.8)

## 2019-05-20 RX ADMIN — CLINDAMYCIN IN 5 PERCENT DEXTROSE SCH MLS/HR: 12 INJECTION, SOLUTION INTRAVENOUS at 10:07

## 2019-05-20 RX ADMIN — ENOXAPARIN SODIUM SCH MG: 80 INJECTION SUBCUTANEOUS at 20:58

## 2019-05-20 RX ADMIN — CLINDAMYCIN IN 5 PERCENT DEXTROSE SCH MLS/HR: 12 INJECTION, SOLUTION INTRAVENOUS at 20:55

## 2019-05-20 RX ADMIN — ENOXAPARIN SODIUM SCH MG: 80 INJECTION SUBCUTANEOUS at 08:04

## 2019-05-20 NOTE — CARD
--------------- APPROVED REPORT --------------





Date of service: 05/19/2019



EKG Measurement

Heart [id number]

[id number]

[id number]

[id number]



<Conclusion>

Atrial fibrillation

Left axis deviation

Voltage criteria for left ventricular hypertrophy

Possible Gregg-lateral infarct, age undetermined

Nonspecific ST-T changes

Abnormal ECG

## 2019-05-20 NOTE — CP.PCM.PN
Subjective





- Date & Time of Evaluation


Date of Evaluation: 05/20/19


Time of Evaluation: 10:45





- Subjective


Subjective: 





                          Clinically doing well


Afebrile, denies chills or fever


Not much cough or expectoration


A Fib at moderate HR


/90 mm Hg


No signs of CHF


RPT blood cultures sent (Initial one, inconclusive)





Objective





- Vital Signs/Intake and Output


Vital Signs (last 24 hours): 


                                        











Temp Pulse Resp BP Pulse Ox


 


 97.8 F   91 H  18   190/95 H  98 


 


 05/20/19 08:00  05/20/19 08:02  05/20/19 08:00  05/20/19 08:02  05/20/19 08:00











- Medications


Medications: 


                               Current Medications





Acetaminophen (Tylenol 325mg Tab)  325 mg PO Q6 PRN


   PRN Reason: Pain, moderate (4-7)


   Last Admin: 05/19/19 20:25 Dose:  325 mg


Albuterol/Ipratropium (Duoneb 3 Mg/0.5 Mg (3 Ml) Ud)  3 ml INH RQ4 PRN


   PRN Reason: Shortness of Breath


   Last Admin: 05/18/19 21:05 Dose:  3 ml


Amlodipine Besylate (Norvasc)  10 mg PO DAILY ANITA


Enoxaparin Sodium (Lovenox)  80 mg SC Q12 ANITA; Protocol


   Last Admin: 05/20/19 08:04 Dose:  80 mg


Hydrochlorothiazide (Hydrodiuril)  25 mg PO DAILY ANITA


Clindamycin Phosphate (Cleocin 600mg/50ml D5w)  600 mg in 50 mls @ 50 mls/hr 

IVPB Q12 ANITA; Protocol


   Last Admin: 05/20/19 10:07 Dose:  50 mls/hr


Aztreonam 1 gm/ Sodium (Chloride)  100 mls @ 100 mls/hr IVPB Q12 ANITA; Protocol


   Last Admin: 05/20/19 10:09 Dose:  100 mls/hr


Amikacin Sulfate 250 mg/ (Sodium Chloride)  101 mls @ 100.609 mls/hr IVPB Q24H 

ANITA; Protocol


Metoprolol Tartrate (Lopressor)  100 mg PO BID ANITA


   Last Admin: 05/20/19 08:02 Dose:  100 mg











- Labs


Labs: 


                                        





                                 05/20/19 10:30 





                                 05/19/19 04:05 





                                        











PT  12.2 Seconds (9.8-13.1)   05/18/19  11:14    


 


INR  1.1   05/18/19  11:14

## 2019-05-21 LAB
ALBUMIN SERPL-MCNC: 4.5 G/DL (ref 3.5–5)
ALBUMIN/GLOB SERPL: 1.1 {RATIO} (ref 1–2.1)
ALT SERPL-CCNC: 23 U/L (ref 9–52)
AST SERPL-CCNC: 27 U/L (ref 14–36)
BUN SERPL-MCNC: 20 MG/DL (ref 7–17)
CALCIUM SERPL-MCNC: 9.1 MG/DL (ref 8.4–10.2)
ERYTHROCYTE [DISTWIDTH] IN BLOOD BY AUTOMATED COUNT: 14.2 % (ref 11.5–14.5)
GFR NON-AFRICAN AMERICAN: > 60
HGB BLD-MCNC: 16.9 G/DL (ref 12–16)
MCH RBC QN AUTO: 28.6 PG (ref 27–31)
MCHC RBC AUTO-ENTMCNC: 33.1 G/DL (ref 33–37)
MCV RBC AUTO: 86.4 FL (ref 81–99)
PLATELET # BLD: 191 K/UL (ref 130–400)
RBC # BLD AUTO: 5.93 MIL/UL (ref 3.8–5.2)
WBC # BLD AUTO: 12.2 K/UL (ref 4.8–10.8)

## 2019-05-21 RX ADMIN — ENOXAPARIN SODIUM SCH MG: 80 INJECTION SUBCUTANEOUS at 09:08

## 2019-05-21 RX ADMIN — CLINDAMYCIN IN 5 PERCENT DEXTROSE SCH MLS/HR: 12 INJECTION, SOLUTION INTRAVENOUS at 10:55

## 2019-05-21 RX ADMIN — ENOXAPARIN SODIUM SCH MG: 80 INJECTION SUBCUTANEOUS at 20:36

## 2019-05-21 RX ADMIN — CLINDAMYCIN IN 5 PERCENT DEXTROSE SCH MLS/HR: 12 INJECTION, SOLUTION INTRAVENOUS at 20:41

## 2019-05-21 NOTE — CP.PCM.PN
Subjective





- Date & Time of Evaluation


Date of Evaluation: 05/21/19


Time of Evaluation: 15:52





- Subjective


Subjective: 





I D NOTE


WBC IS DECREASED TO 12.2


BLOOD CULTURE:E.COLI SENSITIVE TO AMINOGLYCIDES


F/U CULTURES ARE NEGATIVE


CONTINUE SAME RX





Objective





- Vital Signs/Intake and Output


Vital Signs (last 24 hours): 


                                        











Temp Pulse Resp BP Pulse Ox


 


 97.5 F L  84   18   111/71   99 


 


 05/21/19 11:51  05/21/19 11:51  05/21/19 11:51  05/21/19 11:51  05/21/19 11:51








Intake and Output: 


                                        











 05/21/19 05/21/19





 06:59 18:59


 


Intake Total 530 


 


Output Total 400 


 


Balance 130 














- Medications


Medications: 


                               Current Medications





Acetaminophen (Tylenol 325mg Tab)  325 mg PO Q6 PRN


   PRN Reason: Pain, moderate (4-7)


   Last Admin: 05/19/19 20:25 Dose:  325 mg


Albuterol/Ipratropium (Duoneb 3 Mg/0.5 Mg (3 Ml) Ud)  3 ml INH RQ4 PRN


   PRN Reason: Shortness of Breath


   Last Admin: 05/18/19 21:05 Dose:  3 ml


Amlodipine Besylate (Norvasc)  10 mg PO DAILY [facility]


   Last Admin: 05/21/19 09:09 Dose:  10 mg


Enoxaparin Sodium (Lovenox)  80 mg SC Q12 [facility]; Protocol


   Last Admin: 05/21/19 09:08 Dose:  80 mg


Hydrochlorothiazide (Hydrodiuril)  25 mg PO DAILY [facility]


   Last Admin: 05/21/19 09:07 Dose:  25 mg


Clindamycin Phosphate (Cleocin 600mg/50ml D5w)  600 mg in 50 mls @ 50 mls/hr 

IVPB Q12 [facility]; Protocol


   Last Admin: 05/21/19 10:55 Dose:  50 mls/hr


Aztreonam 1 gm/ Sodium (Chloride)  100 mls @ 100 mls/hr IVPB Q12 [facility]; Protocol


   Last Admin: 05/21/19 09:06 Dose:  100 mls/hr


Amikacin Sulfate 250 mg/ (Sodium Chloride)  101 mls @ 100.609 mls/hr IVPB Q24H 

[facility]; Protocol


   Last Admin: 05/21/19 12:32 Dose:  100.609 mls/hr


Metoprolol Tartrate (Lopressor)  100 mg PO BID [facility]


   Last Admin: 05/21/19 09:08 Dose:  100 mg


Risperidone (Risperdal Tab)  0.25 mg PO BID PRN


   PRN Reason: Agitation











- Labs


Labs: 


                                        





                                 05/21/19 10:15 





                                 05/21/19 10:15 





                                        











PT  11.9 Seconds (9.8-13.1)   05/20/19  10:30    


 


INR  1.0   05/20/19  10:30

## 2019-05-21 NOTE — CP.PCM.PN
Subjective





- Date & Time of Evaluation


Date of Evaluation: 05/21/19


Time of Evaluation: 10:40





- Subjective


Subjective: 





                       The patient was found sitting up in the chair having just

finished her physical therapy evaluation and physical therapy.


She denies any episode of sudden shortness of breath or severe coughing spells. 

She denies any chills or fever.


Telemetry shows atrial fibrillation at moderate heart rates.


Her blood pressure was 146/86 mmHg.


There was no evidence of congestive cardiac failure.


Her labs reveal resolving leukocytosis.  Her hemoglobin hematocrit were stable.


Her BUN/creatinine were stable.


The patient will continue her present IV antibiotics.





Objective





- Vital Signs/Intake and Output


Vital Signs (last 24 hours): 


                                        











Temp Pulse Resp BP Pulse Ox


 


 98.0 F   103 H  18   176/85 H  97 


 


 05/21/19 07:50  05/21/19 09:09  05/21/19 07:50  05/21/19 09:09  05/21/19 07:50








Intake and Output: 


                                        











 05/21/19 05/21/19





 06:59 18:59


 


Intake Total 530 


 


Output Total 400 


 


Balance 130 














- Medications


Medications: 


                               Current Medications





Acetaminophen (Tylenol 325mg Tab)  325 mg PO Q6 PRN


   PRN Reason: Pain, moderate (4-7)


   Last Admin: 05/19/19 20:25 Dose:  325 mg


Albuterol/Ipratropium (Duoneb 3 Mg/0.5 Mg (3 Ml) Ud)  3 ml INH RQ4 PRN


   PRN Reason: Shortness of Breath


   Last Admin: 05/18/19 21:05 Dose:  3 ml


Amlodipine Besylate (Norvasc)  10 mg PO DAILY [facility]


   Last Admin: 05/21/19 09:09 Dose:  10 mg


Enoxaparin Sodium (Lovenox)  80 mg SC Q12 [facility]; Protocol


   Last Admin: 05/21/19 09:08 Dose:  80 mg


Hydrochlorothiazide (Hydrodiuril)  25 mg PO DAILY [facility]


   Last Admin: 05/21/19 09:07 Dose:  25 mg


Clindamycin Phosphate (Cleocin 600mg/50ml D5w)  600 mg in 50 mls @ 50 mls/hr 

IVPB Q12 [facility]; Protocol


   Last Admin: 05/21/19 10:55 Dose:  50 mls/hr


Aztreonam 1 gm/ Sodium (Chloride)  100 mls @ 100 mls/hr IVPB Q12 [facility]; Protocol


   Last Admin: 05/21/19 09:06 Dose:  100 mls/hr


Amikacin Sulfate 250 mg/ (Sodium Chloride)  101 mls @ 100.609 mls/hr IVPB Q24H 

[facility]; Protocol


   Last Admin: 05/20/19 12:11 Dose:  100.609 mls/hr


Metoprolol Tartrate (Lopressor)  100 mg PO BID [facility]


   Last Admin: 05/21/19 09:08 Dose:  100 mg











- Labs


Labs: 


                                        





                                 05/21/19 10:15 





                                 05/21/19 10:15 





                                        











PT  11.9 Seconds (9.8-13.1)   05/20/19  10:30    


 


INR  1.0   05/20/19  10:30

## 2019-05-21 NOTE — CP.PCM.CON
History of Present Illness





- History of Present Illness


History of Present Illness: 


consult requested for episodes of agitation








pt is an  86 year old female with medical history of dementia,  COPD, HTN, and 

Afib, arrives to the emergency department via EMS after her  called 911, 

for an evaluation of chest pain, shaking, and chills


pt currently on antibiotics for infection, as per staff pt has episodes of paresh

tation and irritability, 


pt on evaluation, calm, cooperative speech soft, pleasant, confused oriented to 

person only not to place or time , no reported changes in sleep or appetite 

denied any current suicidal or homicidal ideation denied perceptual 

disturbances, non elicited  








Past Patient History





- Past Medical History & Family History


Past Medical History?: Yes





- Past Social History


Smoking Status: Never Smoked





- CARDIAC


Hx Cardiac Disorders: Yes





- PULMONARY


Hx Bronchitis: Yes (Chronic)


Hx Chronic Obstructive Pulmonary Disease (COPD): Yes


Hx Pneumonia: Yes





- NEUROLOGICAL


Hx Neurological Disorder: Yes


Hx Dizziness: Yes (LIGHTHEADEDNESS)





- HEENT


Hx HEENT Problems: Yes


Hx Cataracts: Yes





- RENAL


Hx Chronic Kidney Disease: Yes





- ENDOCRINE/METABOLIC


Hx Endocrine Disorders: No





- HEMATOLOGICAL/ONCOLOGICAL


Hx AIDS: No


Hx Human Immunodeficiency Virus (HIV): No





- MUSCULOSKELETAL/RHEUMATOLOGICAL


Hx Falls: No





- GASTROINTESTINAL


Hx Gall Bladder Disease: Yes





- GENITOURINARY/GYNECOLOGICAL


Hx Genitourinary Disorders: Yes


Hx Hematuria: Yes





- PSYCHIATRIC


Hx Substance Use: No





- SURGICAL HISTORY


Hx Surgeries: Yes


Hx Cataract Extraction: Yes


Other/Comment: Ovarian cyst





- ANESTHESIA


Hx Anesthesia: Yes


Hx Anesthesia Reactions: No





Meds


Allergies/Adverse Reactions: 


                                    Allergies











Allergy/AdvReac Type Severity Reaction Status Date / Time


 


Penicillins Allergy  RASH Verified 05/18/19 09:44














- Medications


Medications: 


                               Current Medications





Acetaminophen (Tylenol 325mg Tab)  325 mg PO Q6 PRN


   PRN Reason: Pain, moderate (4-7)


   Last Admin: 05/19/19 20:25 Dose:  325 mg


Albuterol/Ipratropium (Duoneb 3 Mg/0.5 Mg (3 Ml) Ud)  3 ml INH RQ4 PRN


   PRN Reason: Shortness of Breath


   Last Admin: 05/18/19 21:05 Dose:  3 ml


Amlodipine Besylate (Norvasc)  10 mg PO DAILY [facility]


   Last Admin: 05/21/19 09:09 Dose:  10 mg


Enoxaparin Sodium (Lovenox)  80 mg SC Q12 [facility]; Protocol


   Last Admin: 05/21/19 09:08 Dose:  80 mg


Hydrochlorothiazide (Hydrodiuril)  25 mg PO DAILY [facility]


   Last Admin: 05/21/19 09:07 Dose:  25 mg


Clindamycin Phosphate (Cleocin 600mg/50ml D5w)  600 mg in 50 mls @ 50 mls/hr 

IVPB Q12 [facility]; Protocol


   Last Admin: 05/21/19 10:55 Dose:  50 mls/hr


Aztreonam 1 gm/ Sodium (Chloride)  100 mls @ 100 mls/hr IVPB Q12 [facility]; Protocol


   Last Admin: 05/21/19 09:06 Dose:  100 mls/hr


Amikacin Sulfate 250 mg/ (Sodium Chloride)  101 mls @ 100.609 mls/hr IVPB Q24H 

[facility]; Protocol


   Last Admin: 05/21/19 12:32 Dose:  100.609 mls/hr


Metoprolol Tartrate (Lopressor)  100 mg PO BID [facility]


   Last Admin: 05/21/19 09:08 Dose:  100 mg











Results





- Vital Signs


Recent Vital Signs: 


                                Last Vital Signs











Temp  97.5 F L  05/21/19 11:51


 


Pulse  84   05/21/19 11:51


 


Resp  18   05/21/19 11:51


 


BP  111/71   05/21/19 11:51


 


Pulse Ox  99   05/21/19 11:51














- Labs


Result Diagrams: 


                                 05/21/19 10:15





                                 05/21/19 10:15


Labs: 


                         Laboratory Results - last 24 hr











  05/21/19 05/21/19





  10:15 10:15


 


WBC  12.2 H 


 


RBC  5.93 H 


 


Hgb  16.9 H 


 


Hct  51.2 H 


 


MCV  86.4 


 


MCH  28.6 


 


MCHC  33.1 


 


RDW  14.2 


 


Plt Count  191 


 


Sodium   140


 


Potassium   3.7


 


Chloride   100


 


Carbon Dioxide   26


 


Anion Gap   18


 


BUN   20 H


 


Creatinine   0.7


 


Est GFR ( Amer)   > 60


 


Est GFR (Non-Af Amer)   > 60


 


Random Glucose   102


 


Calcium   9.1


 


Total Bilirubin   1.0


 


AST   27


 


ALT   23


 


Alkaline Phosphatase   77


 


Total Protein   8.4 H


 


Albumin   4.5


 


Globulin   3.9


 


Albumin/Globulin Ratio   1.1














Assessment & Plan





- Assessment and Plan (Free Text)


Assessment: 





major neurocognitive disorder


delirium


Plan: 





recommend starting risperidone mtab 0.5mg q12 prn for agitation

## 2019-05-22 LAB
BUN SERPL-MCNC: 33 MG/DL (ref 7–17)
CALCIUM SERPL-MCNC: 8.5 MG/DL (ref 8.4–10.2)
ERYTHROCYTE [DISTWIDTH] IN BLOOD BY AUTOMATED COUNT: 14.2 % (ref 11.5–14.5)
GFR NON-AFRICAN AMERICAN: > 60
HGB BLD-MCNC: 15.7 G/DL (ref 12–16)
MCH RBC QN AUTO: 28.2 PG (ref 27–31)
MCHC RBC AUTO-ENTMCNC: 32.7 G/DL (ref 33–37)
MCV RBC AUTO: 86.3 FL (ref 81–99)
PLATELET # BLD: 174 K/UL (ref 130–400)
RBC # BLD AUTO: 5.58 MIL/UL (ref 3.8–5.2)
WBC # BLD AUTO: 7.9 K/UL (ref 4.8–10.8)

## 2019-05-22 RX ADMIN — CLINDAMYCIN IN 5 PERCENT DEXTROSE SCH MLS/HR: 12 INJECTION, SOLUTION INTRAVENOUS at 21:10

## 2019-05-22 RX ADMIN — CLINDAMYCIN IN 5 PERCENT DEXTROSE SCH MLS/HR: 12 INJECTION, SOLUTION INTRAVENOUS at 11:05

## 2019-05-22 RX ADMIN — ENOXAPARIN SODIUM SCH MG: 80 INJECTION SUBCUTANEOUS at 09:44

## 2019-05-22 RX ADMIN — ENOXAPARIN SODIUM SCH MG: 80 INJECTION SUBCUTANEOUS at 21:11

## 2019-05-22 NOTE — RAD
Date of service: 



05/21/2019



PROCEDURE:  CHEST RADIOGRAPH, 1 VIEW



HISTORY:

PNEUMONIA



COMPARISON:

5/18/2019



FINDINGS:



LUNGS:

Current study less exposed than prior.



The previously referenced left retrocardiac infiltrate is no longer 

seen. 



PLEURA:

No pneumothorax or pleural fluid seen.



CARDIOVASCULAR:

 There is presence of aortic atherosclerotic calcification on x-ray. 

Mild cardiomegaly



No significant appearing pulmonary venous congestion.



OSSEOUS STRUCTURES:

Thoracic spondylosis.  Bilateral shoulder arthrosis.



VISUALIZED UPPER ABDOMEN:

Normal.



OTHER FINDINGS:

None. 



IMPRESSION:

Previously referenced left retrocardiac infiltrate not now 

appreciated.

## 2019-05-23 LAB
BUN SERPL-MCNC: 38 MG/DL (ref 7–17)
CALCIUM SERPL-MCNC: 8.6 MG/DL (ref 8.4–10.2)
ERYTHROCYTE [DISTWIDTH] IN BLOOD BY AUTOMATED COUNT: 13.8 % (ref 11.5–14.5)
GFR NON-AFRICAN AMERICAN: > 60
HGB BLD-MCNC: 15.3 G/DL (ref 12–16)
MCH RBC QN AUTO: 28.8 PG (ref 27–31)
MCHC RBC AUTO-ENTMCNC: 33.2 G/DL (ref 33–37)
MCV RBC AUTO: 86.8 FL (ref 81–99)
PLATELET # BLD: 185 K/UL (ref 130–400)
RBC # BLD AUTO: 5.32 MIL/UL (ref 3.8–5.2)
WBC # BLD AUTO: 9.4 K/UL (ref 4.8–10.8)

## 2019-05-23 RX ADMIN — ENOXAPARIN SODIUM SCH MG: 80 INJECTION SUBCUTANEOUS at 21:36

## 2019-05-23 RX ADMIN — CLINDAMYCIN IN 5 PERCENT DEXTROSE SCH MLS/HR: 12 INJECTION, SOLUTION INTRAVENOUS at 21:28

## 2019-05-23 RX ADMIN — CLINDAMYCIN IN 5 PERCENT DEXTROSE SCH MLS/HR: 12 INJECTION, SOLUTION INTRAVENOUS at 08:38

## 2019-05-23 RX ADMIN — ENOXAPARIN SODIUM SCH MG: 80 INJECTION SUBCUTANEOUS at 08:41

## 2019-05-23 NOTE — PQF
PROVIDER RESPONSE TEXT:



Appears to be Bacterial



REVIEWER QUERY TEXT:



Pneumonia Specificity









Community Aquired Pneumonia ( method of acquisition )  is documented in the Medical Record. Please sp
ecify the possible type of pneumonia and the causative organism (includes probable or suspected)

Such as:

Type:

-- Aspiration pneumonia (please also specify the aspirate)

-- Bacterial (please document suspected or probable organism) if known

-- Bronchopneumonia (please document suspected or probable organism)

-- Interstitial pneumonia

-- Organizing pneumonia / BOOP

-- Viral

-- Other, please specify





The patient's Clinical Indicators include:

Admitted with chest pain and shaking chills.



WBC 7.2-> 17 with a L shift and 4% BANDS, Temp 97.8,  Blood CS x 1: E Coli



CXR: New LLL/ retrocardiac infiltrate likely pneumonia



Rx: Triple IVAB







Query created by: Maureen Hearn on 5/23/2019 6:34 AM





Electronically signed by:  Dane Guevara MD 5/23/2019 11:11 AM

## 2019-05-23 NOTE — PQF
PROVIDER RESPONSE TEXT:



Doubt that there is sepsis



REVIEWER QUERY TEXT:



Documentation Clarification









Your help is requested in clarifying the following clinical documentation, if you can please further 
specify in the medical record and discharge summary.



Bacteremia is documented in the medical record. Please clarify if this is bacteremia with or without 
sepsis.





The patient's Clinical Indicators include:

Presented with chest pain, shaking, and chills.



WBC 7.2 L shift 4% BANDS, --> WBC 17.  Lactate 2.6.  BLOOD CS x 1: E Coli

CXR: New left lower lobe/ retrocardiac infiltrate likely pneumonia.





TEMP: 97.8, 98.1, 97.4, 97.4, 99.8, 97.6, 97.5

HR: 109, 108, 121, 99, 91, 110, 110, 11, 117

BP: 170/105, 196/111, 179/92, 148/73, 151/96

R 22, 20, 22, 20, 20



Rx: Triple IVAB







Query created by: Maureen Hearn on 5/23/2019 6:35 AM















Electronically signed by:  Dane Guevara MD 5/23/2019 11:11 AM

## 2019-05-23 NOTE — CP.PCM.PN
Subjective





- Date & Time of Evaluation


Date of Evaluation: 05/23/19


Time of Evaluation: 10:00





- Subjective


Subjective: 





Pt doing well


still w/ some confusion


she realizes that she has been getting confused





One : One 





Blood cultures + E. Coli





Objective





- Vital Signs/Intake and Output


Vital Signs (last 24 hours): 


                                        











Temp Pulse Resp BP Pulse Ox


 


 98.0 F   68   18   152/86 H  97 


 


 05/23/19 08:00  05/23/19 08:40  05/23/19 08:00  05/23/19 08:40  05/23/19 08:00











- Medications


Medications: 


                               Current Medications





Acetaminophen (Tylenol 325mg Tab)  325 mg PO Q6 PRN


   PRN Reason: Pain, moderate (4-7)


   Last Admin: 05/19/19 20:25 Dose:  325 mg


Albuterol/Ipratropium (Duoneb 3 Mg/0.5 Mg (3 Ml) Ud)  3 ml INH RQ4 PRN


   PRN Reason: Shortness of Breath


   Last Admin: 05/18/19 21:05 Dose:  3 ml


Amlodipine Besylate (Norvasc)  10 mg PO DAILY [facility]


   Last Admin: 05/23/19 08:39 Dose:  10 mg


Enoxaparin Sodium (Lovenox)  80 mg SC Q12 [facility]; Protocol


   Last Admin: 05/23/19 08:41 Dose:  80 mg


Hydrochlorothiazide (Hydrodiuril)  25 mg PO DAILY [facility]


   Last Admin: 05/23/19 08:40 Dose:  25 mg


Clindamycin Phosphate (Cleocin 600mg/50ml D5w)  600 mg in 50 mls @ 50 mls/hr 

IVPB Q12 [facility]; Protocol


   Last Admin: 05/23/19 08:38 Dose:  50 mls/hr


Aztreonam 1 gm/ Sodium (Chloride)  100 mls @ 100 mls/hr IVPB Q12 [facility]; Protocol


   Last Admin: 05/23/19 08:39 Dose:  100 mls/hr


Amikacin Sulfate 250 mg/ (Sodium Chloride)  101 mls @ 100.609 mls/hr IVPB Q24H 

[facility]; Protocol


   Last Admin: 05/22/19 13:17 Dose:  100.609 mls/hr


Metoprolol Tartrate (Lopressor)  100 mg PO BID [facility]


   Last Admin: 05/23/19 08:40 Dose:  100 mg


Risperidone (Risperdal Tab)  0.25 mg PO BID PRN


   PRN Reason: Agitation


   Last Admin: 05/21/19 20:33 Dose:  0.25 mg











- Labs


Labs: 


                                        





                                 05/23/19 05:15 





                                 05/23/19 05:15 





                                        











PT  11.9 Seconds (9.8-13.1)   05/20/19  10:30    


 


INR  1.0   05/20/19  10:30    














Assessment and Plan


(1) Atrial fibrillation with RVR


Status: Acute   





(2) Pneumonia


Status: Acute   





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


Status: Acute   





(4) Old myocardial infarction


Status: Acute

## 2019-05-24 RX ADMIN — ENOXAPARIN SODIUM SCH: 80 INJECTION SUBCUTANEOUS at 10:03

## 2019-05-24 RX ADMIN — ENOXAPARIN SODIUM SCH MG: 80 INJECTION SUBCUTANEOUS at 21:44

## 2019-05-24 RX ADMIN — CLINDAMYCIN IN 5 PERCENT DEXTROSE SCH: 12 INJECTION, SOLUTION INTRAVENOUS at 16:33

## 2019-05-24 RX ADMIN — CLINDAMYCIN IN 5 PERCENT DEXTROSE SCH MLS/HR: 12 INJECTION, SOLUTION INTRAVENOUS at 14:14

## 2019-05-24 RX ADMIN — DIVALPROEX SODIUM SCH MG: 125 CAPSULE ORAL at 16:38

## 2019-05-24 NOTE — CP.PCM.CON
History of Present Illness





- History of Present Illness


History of Present Illness: 





follow up consult





pt is an  86 year old female with medical history of dementia,  COPD, HTN, and 

Afib, arrives to the emergency department via EMS after her  called 911, 

for an evaluation of chest pain, shaking, and chills


pt currently on antibiotics for infection, as per staff pt has episodes of 

agitation and irritability, combative with care at times pulling out the IV 

lines 


pt on evaluation cooperative calm yet very confused oriented to person only 

speech irrelevant and not goal directed 





Past Patient History





- Past Medical History & Family History


Past Medical History?: Yes





- Past Social History


Smoking Status: Never Smoked





- CARDIAC


Hx Cardiac Disorders: Yes





- PULMONARY


Hx Bronchitis: Yes (Chronic)


Hx Chronic Obstructive Pulmonary Disease (COPD): Yes


Hx Pneumonia: Yes





- NEUROLOGICAL


Hx Neurological Disorder: Yes


Hx Dizziness: Yes (LIGHTHEADEDNESS)





- HEENT


Hx HEENT Problems: Yes


Hx Cataracts: Yes





- RENAL


Hx Chronic Kidney Disease: Yes





- ENDOCRINE/METABOLIC


Hx Endocrine Disorders: No





- HEMATOLOGICAL/ONCOLOGICAL


Hx AIDS: No


Hx Human Immunodeficiency Virus (HIV): No





- MUSCULOSKELETAL/RHEUMATOLOGICAL


Hx Falls: No





- GASTROINTESTINAL


Hx Gall Bladder Disease: Yes





- GENITOURINARY/GYNECOLOGICAL


Hx Genitourinary Disorders: Yes


Hx Hematuria: Yes





- PSYCHIATRIC


Hx Substance Use: No





- SURGICAL HISTORY


Hx Surgeries: Yes


Hx Cataract Extraction: Yes


Other/Comment: Ovarian cyst





- ANESTHESIA


Hx Anesthesia: Yes


Hx Anesthesia Reactions: No





Meds


Allergies/Adverse Reactions: 


                                    Allergies











Allergy/AdvReac Type Severity Reaction Status Date / Time


 


Penicillins Allergy  RASH Verified 05/18/19 09:44














- Medications


Medications: 


                               Current Medications





Acetaminophen (Tylenol 325mg Tab)  325 mg PO Q6 PRN


   PRN Reason: Pain, moderate (4-7)


   Last Admin: 05/19/19 20:25 Dose:  325 mg


Albuterol/Ipratropium (Duoneb 3 Mg/0.5 Mg (3 Ml) Ud)  3 ml INH RQ4 PRN


   PRN Reason: Shortness of Breath


   Last Admin: 05/18/19 21:05 Dose:  3 ml


Amlodipine Besylate (Norvasc)  10 mg PO DAILY [facility]


   Last Admin: 05/24/19 09:54 Dose:  10 mg


Enoxaparin Sodium (Lovenox)  80 mg SC Q12 [facility]; Protocol


   Last Admin: 05/24/19 10:03 Dose:  Not Given


Hydrochlorothiazide (Hydrodiuril)  25 mg PO DAILY [facility]


   Last Admin: 05/24/19 09:57 Dose:  25 mg


Amikacin Sulfate 250 mg/ (Sodium Chloride)  101 mls @ 100.609 mls/hr IVPB Q24H 

[facility]; Protocol


   Last Admin: 05/23/19 12:54 Dose:  100.609 mls/hr


Clindamycin Phosphate (Cleocin 600mg/50ml D5w)  600 mg in 50 mls @ 50 mls/hr 

IVPB Q12H [facility]; Protocol


Aztreonam 1 gm/ Sodium (Chloride)  100 mls @ 100 mls/hr IVPB Q12H [facility]; Protocol


Metoprolol Tartrate (Lopressor)  100 mg PO BID@0900,2100 [facility]


   Last Admin: 05/24/19 09:57 Dose:  100 mg


Risperidone (Risperdal Tab)  0.25 mg PO BID PRN


   PRN Reason: Agitation


   Last Admin: 05/24/19 10:46 Dose:  0.25 mg











Results





- Vital Signs


Recent Vital Signs: 


                                Last Vital Signs











Temp  97.4 F L  05/24/19 12:51


 


Pulse  81   05/24/19 12:51


 


Resp  20   05/24/19 12:51


 


BP  119/70   05/24/19 12:51


 


Pulse Ox  95   05/24/19 12:51














- Labs


Result Diagrams: 


                                 05/23/19 05:15





                                 05/23/19 05:15





Assessment & Plan





- Assessment and Plan (Free Text)


Assessment: 





delirium


major neurocognitive disorder 


Plan: 





recommend starting depakote sprinkles 125 mg tid 


continue with risperidone 0.5mg q12 prn for agitation

## 2019-05-24 NOTE — CT
Date of service: 



05/24/2019



PROCEDURE:  CT HEAD WITHOUT CONTRAST.



HISTORY:

confusion



COMPARISON:

10/21/2018



TECHNIQUE:

Axial computed tomography images were obtained through the head/brain 

without intravenous contrast.  



Radiation dose:



Total exam DLP = 891.76 mGy-cm.



This CT exam was performed using one or more of the following dose 

reduction techniques: Automated exposure control, adjustment of the 

mA and/or kV according to patient size, and/or use of iterative 

reconstruction technique.



FINDINGS:



HEMORRHAGE:

No intracranial hemorrhage. 



BRAIN:

No mass effect or edema.  Mild to moderate diffuse age-appropriate 

atrophy.  Mild periventricular white matter lucency consistent with 

chronic microvascular ischemic change.



VENTRICLES:

No hydrocephalus.  Incidental cavum septum pellucidum. 



CALVARIUM:

Unremarkable.



PARANASAL SINUSES:

Unremarkable as visualized. No significant inflammatory changes.



MASTOID AIR CELLS:

Unremarkable as visualized. No inflammatory changes.



OTHER FINDINGS:

None.



IMPRESSION:

No intracranial mass, hemorrhage or evidence of acute infarct.  Age 

related atrophy and chronic white matter ischemic change.

## 2019-05-24 NOTE — CP.PCM.PN
Subjective





- Date & Time of Evaluation


Date of Evaluation: 05/24/19


Time of Evaluation: 10:00





- Subjective


Subjective: 





Feels well


no complaints


mild confusion





afebrile








Objective





- Vital Signs/Intake and Output


Vital Signs (last 24 hours): 


                                        











Temp Pulse Resp BP Pulse Ox


 


 98.2 F   90   20   125/71   96 


 


 05/24/19 04:57  05/24/19 09:57  05/24/19 08:02  05/24/19 09:57  05/24/19 08:02











- Medications


Medications: 


                               Current Medications





Acetaminophen (Tylenol 325mg Tab)  325 mg PO Q6 PRN


   PRN Reason: Pain, moderate (4-7)


   Last Admin: 05/19/19 20:25 Dose:  325 mg


Albuterol/Ipratropium (Duoneb 3 Mg/0.5 Mg (3 Ml) Ud)  3 ml INH RQ4 PRN


   PRN Reason: Shortness of Breath


   Last Admin: 05/18/19 21:05 Dose:  3 ml


Amlodipine Besylate (Norvasc)  10 mg PO DAILY [facility]


   Last Admin: 05/24/19 09:54 Dose:  10 mg


Enoxaparin Sodium (Lovenox)  80 mg SC Q12 [facility]; Protocol


   Last Admin: 05/24/19 10:03 Dose:  Not Given


Hydrochlorothiazide (Hydrodiuril)  25 mg PO DAILY [facility]


   Last Admin: 05/24/19 09:57 Dose:  25 mg


Clindamycin Phosphate (Cleocin 600mg/50ml D5w)  600 mg in 50 mls @ 50 mls/hr 

IVPB Q12 [facility]; Protocol


   Last Admin: 05/23/19 21:28 Dose:  50 mls/hr


Aztreonam 1 gm/ Sodium (Chloride)  100 mls @ 100 mls/hr IVPB Q12 [facility]; Protocol


   Last Admin: 05/23/19 21:33 Dose:  100 mls/hr


Amikacin Sulfate 250 mg/ (Sodium Chloride)  101 mls @ 100.609 mls/hr IVPB Q24H 

[facility]; Protocol


   Last Admin: 05/23/19 12:54 Dose:  100.609 mls/hr


Metoprolol Tartrate (Lopressor)  100 mg PO BID@0900,2100 [facility]


   Last Admin: 05/24/19 09:57 Dose:  100 mg


Risperidone (Risperdal Tab)  0.25 mg PO BID PRN


   PRN Reason: Agitation


   Last Admin: 05/24/19 10:46 Dose:  0.25 mg











- Labs


Labs: 


                                        





                                 05/23/19 05:15 





                                 05/23/19 05:15 





                                        











PT  11.9 Seconds (9.8-13.1)   05/20/19  10:30    


 


INR  1.0   05/20/19  10:30    














Assessment and Plan


(1) Atrial fibrillation with RVR


Status: Acute   





(2) Pneumonia


Status: Acute   





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


Status: Acute   





(4) Old myocardial infarction


Status: Acute

## 2019-05-25 RX ADMIN — ENOXAPARIN SODIUM SCH MG: 80 INJECTION SUBCUTANEOUS at 21:56

## 2019-05-25 RX ADMIN — DIVALPROEX SODIUM SCH MG: 125 CAPSULE ORAL at 16:13

## 2019-05-25 RX ADMIN — CLINDAMYCIN IN 5 PERCENT DEXTROSE SCH MLS/HR: 12 INJECTION, SOLUTION INTRAVENOUS at 14:49

## 2019-05-25 RX ADMIN — DIVALPROEX SODIUM SCH MG: 125 CAPSULE ORAL at 09:20

## 2019-05-25 RX ADMIN — CLINDAMYCIN IN 5 PERCENT DEXTROSE SCH MLS/HR: 12 INJECTION, SOLUTION INTRAVENOUS at 01:34

## 2019-05-25 RX ADMIN — DIVALPROEX SODIUM SCH MG: 125 CAPSULE ORAL at 12:57

## 2019-05-25 RX ADMIN — ENOXAPARIN SODIUM SCH MG: 80 INJECTION SUBCUTANEOUS at 09:22

## 2019-05-25 NOTE — CP.PCM.PN
Subjective





- Date & Time of Evaluation


Date of Evaluation: 05/25/19


Time of Evaluation: 10:00





- Subjective


Subjective: 





Comfortable sitting in chair at bedside


Confused


yesterday attempted to get up and walk on her own


cannot walk on her own w/o a walker 





CT scan of Haed: WNL





Objective





- Vital Signs/Intake and Output


Vital Signs (last 24 hours): 


                                        











Temp Pulse Resp BP Pulse Ox


 


 97.5 F L  98 H  20   137/90   98 


 


 05/25/19 07:56  05/25/19 09:22  05/25/19 07:56  05/25/19 09:22  05/25/19 07:56











- Medications


Medications: 


                               Current Medications





Acetaminophen (Tylenol 325mg Tab)  325 mg PO Q6 PRN


   PRN Reason: Pain, moderate (4-7)


   Last Admin: 05/19/19 20:25 Dose:  325 mg


Albuterol/Ipratropium (Duoneb 3 Mg/0.5 Mg (3 Ml) Ud)  3 ml INH RQ4 PRN


   PRN Reason: Shortness of Breath


   Last Admin: 05/18/19 21:05 Dose:  3 ml


Amlodipine Besylate (Norvasc)  10 mg PO DAILY [facility]


   Last Admin: 05/25/19 09:22 Dose:  10 mg


Divalproex Sodium (Depakote Sprinkles)  125 mg PO TID [facility]


   Last Admin: 05/25/19 09:20 Dose:  125 mg


Enoxaparin Sodium (Lovenox)  80 mg SC Q12 [facility]; Protocol


   Last Admin: 05/25/19 09:22 Dose:  80 mg


Hydrochlorothiazide (Hydrodiuril)  25 mg PO DAILY [facility]


   Last Admin: 05/25/19 09:21 Dose:  25 mg


Amikacin Sulfate 250 mg/ (Sodium Chloride)  101 mls @ 100.609 mls/hr IVPB Q24H 

[facility]; Protocol


   Last Admin: 05/24/19 16:36 Dose:  100.609 mls/hr


Clindamycin Phosphate (Cleocin 600mg/50ml D5w)  600 mg in 50 mls @ 50 mls/hr 

IVPB Q12H [facility]; Protocol


   Last Admin: 05/25/19 01:34 Dose:  50 mls/hr


Aztreonam 1 gm/ Sodium (Chloride)  100 mls @ 100 mls/hr IVPB Q12H [facility]; Protocol


   Last Admin: 05/25/19 01:22 Dose:  100 mls/hr


Metoprolol Tartrate (Lopressor)  100 mg PO BID@0900,2100 [facility]


   Last Admin: 05/25/19 09:21 Dose:  100 mg


Risperidone (Risperdal Tab)  0.25 mg PO BID PRN


   PRN Reason: Agitation


   Last Admin: 05/24/19 21:45 Dose:  0.25 mg











- Labs


Labs: 


                                        





                                 05/23/19 05:15 





                                 05/23/19 05:15 





                                        











PT  11.9 Seconds (9.8-13.1)   05/20/19  10:30    


 


INR  1.0   05/20/19  10:30    














Assessment and Plan


(1) Atrial fibrillation with RVR


Status: Acute   





(2) Pneumonia


Status: Acute   





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


Status: Acute   





(4) Old myocardial infarction


Status: Acute

## 2019-05-26 RX ADMIN — DIVALPROEX SODIUM SCH MG: 125 CAPSULE ORAL at 17:24

## 2019-05-26 RX ADMIN — DIVALPROEX SODIUM SCH MG: 125 CAPSULE ORAL at 12:53

## 2019-05-26 RX ADMIN — CLINDAMYCIN IN 5 PERCENT DEXTROSE SCH MLS/HR: 12 INJECTION, SOLUTION INTRAVENOUS at 02:11

## 2019-05-26 RX ADMIN — CLINDAMYCIN IN 5 PERCENT DEXTROSE SCH MLS/HR: 12 INJECTION, SOLUTION INTRAVENOUS at 12:52

## 2019-05-26 RX ADMIN — ENOXAPARIN SODIUM SCH MG: 80 INJECTION SUBCUTANEOUS at 21:22

## 2019-05-26 RX ADMIN — DIVALPROEX SODIUM SCH MG: 125 CAPSULE ORAL at 08:56

## 2019-05-26 RX ADMIN — ENOXAPARIN SODIUM SCH MG: 80 INJECTION SUBCUTANEOUS at 08:59

## 2019-05-27 RX ADMIN — DIVALPROEX SODIUM SCH MG: 125 CAPSULE ORAL at 09:22

## 2019-05-27 RX ADMIN — ENOXAPARIN SODIUM SCH: 80 INJECTION SUBCUTANEOUS at 09:28

## 2019-05-27 RX ADMIN — CLINDAMYCIN IN 5 PERCENT DEXTROSE SCH MLS/HR: 12 INJECTION, SOLUTION INTRAVENOUS at 01:47

## 2019-05-27 RX ADMIN — ENOXAPARIN SODIUM SCH MG: 80 INJECTION SUBCUTANEOUS at 09:24

## 2019-05-27 RX ADMIN — DIVALPROEX SODIUM SCH MG: 125 CAPSULE ORAL at 16:09

## 2019-05-27 RX ADMIN — CLINDAMYCIN IN 5 PERCENT DEXTROSE SCH MLS/HR: 12 INJECTION, SOLUTION INTRAVENOUS at 13:12

## 2019-05-27 RX ADMIN — DIVALPROEX SODIUM SCH MG: 125 CAPSULE ORAL at 13:10

## 2019-05-27 RX ADMIN — ENOXAPARIN SODIUM SCH MG: 80 INJECTION SUBCUTANEOUS at 21:55

## 2019-05-27 NOTE — CP.PCM.PN
Subjective





- Date & Time of Evaluation


Date of Evaluation: 05/27/19


Time of Evaluation: 10:00





- Subjective


Subjective: 





Remains confused 


tries to get out of bed





breathing easily





afebrile





Objective





- Vital Signs/Intake and Output


Vital Signs (last 24 hours): 


                                        











Temp Pulse Resp BP Pulse Ox


 


 98.1 F   66   18   127/61   97 


 


 05/27/19 08:10  05/27/19 09:25  05/27/19 08:10  05/27/19 09:25  05/27/19 08:10











- Medications


Medications: 


                               Current Medications





Acetaminophen (Tylenol 325mg Tab)  325 mg PO Q6 PRN


   PRN Reason: Pain, moderate (4-7)


   Last Admin: 05/19/19 20:25 Dose:  325 mg


Albuterol/Ipratropium (Duoneb 3 Mg/0.5 Mg (3 Ml) Ud)  3 ml INH RQ4 PRN


   PRN Reason: Shortness of Breath


   Last Admin: 05/18/19 21:05 Dose:  3 ml


Amlodipine Besylate (Norvasc)  10 mg PO DAILY [facility]


   Last Admin: 05/27/19 09:25 Dose:  10 mg


Divalproex Sodium (Depakote Sprinkles)  125 mg PO TID [facility]


   Last Admin: 05/27/19 09:22 Dose:  125 mg


Enoxaparin Sodium (Lovenox)  80 mg SC Q12 [facility]; Protocol


   Last Admin: 05/27/19 09:28 Dose:  Not Given


Hydrochlorothiazide (Hydrodiuril)  25 mg PO DAILY [facility]


   Last Admin: 05/27/19 09:22 Dose:  25 mg


Amikacin Sulfate 250 mg/ (Sodium Chloride)  101 mls @ 100.609 mls/hr IVPB Q24H 

[facility]; Protocol


   Last Admin: 05/26/19 12:51 Dose:  100.609 mls/hr


Clindamycin Phosphate (Cleocin 600mg/50ml D5w)  600 mg in 50 mls @ 50 mls/hr 

IVPB Q12H [facility]; Protocol


   Last Admin: 05/27/19 01:47 Dose:  50 mls/hr


Aztreonam 1 gm/ Sodium (Chloride)  100 mls @ 100 mls/hr IVPB Q12H [facility]; Protocol


   Last Admin: 05/27/19 01:45 Dose:  100 mls/hr


Metoprolol Tartrate (Lopressor)  100 mg PO BID@0900,2100 [facility]


   Last Admin: 05/27/19 09:23 Dose:  100 mg


Risperidone (Risperdal Tab)  0.25 mg PO BID PRN


   PRN Reason: Agitation


   Last Admin: 05/26/19 19:43 Dose:  0.25 mg











- Labs


Labs: 


                                        





                                 05/23/19 05:15 





                                 05/23/19 05:15 





                                        











PT  11.9 Seconds (9.8-13.1)   05/20/19  10:30    


 


INR  1.0   05/20/19  10:30    














Assessment and Plan


(1) Atrial fibrillation with RVR


Status: Acute   





(2) Pneumonia


Status: Acute   





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


Status: Acute   





(4) Old myocardial infarction


Status: Acute

## 2019-05-28 RX ADMIN — ENOXAPARIN SODIUM SCH MG: 80 INJECTION SUBCUTANEOUS at 10:18

## 2019-05-28 RX ADMIN — DIVALPROEX SODIUM SCH MG: 125 CAPSULE ORAL at 14:03

## 2019-05-28 RX ADMIN — ENOXAPARIN SODIUM SCH MG: 80 INJECTION SUBCUTANEOUS at 21:49

## 2019-05-28 RX ADMIN — DIVALPROEX SODIUM SCH MG: 125 CAPSULE ORAL at 10:16

## 2019-05-28 RX ADMIN — CLINDAMYCIN IN 5 PERCENT DEXTROSE SCH MLS/HR: 12 INJECTION, SOLUTION INTRAVENOUS at 15:17

## 2019-05-28 RX ADMIN — CLINDAMYCIN IN 5 PERCENT DEXTROSE SCH MLS/HR: 12 INJECTION, SOLUTION INTRAVENOUS at 02:21

## 2019-05-28 RX ADMIN — DIVALPROEX SODIUM SCH MG: 125 CAPSULE ORAL at 16:43

## 2019-05-28 NOTE — CP.PCM.PN
Subjective





- Date & Time of Evaluation


Date of Evaluation: 05/28/19


Time of Evaluation: 10:00





- Subjective


Subjective: 





offers no complaints


claims she doesn't know how she feels


breathing easily


confused





Objective





- Vital Signs/Intake and Output


Vital Signs (last 24 hours): 


                                        











Temp Pulse Resp BP Pulse Ox


 


 98.0 F   67   20   125/63   96 


 


 05/28/19 07:51  05/28/19 10:17  05/28/19 07:51  05/28/19 10:17  05/28/19 07:51











- Medications


Medications: 


                               Current Medications





Acetaminophen (Tylenol 325mg Tab)  325 mg PO Q6 PRN


   PRN Reason: Pain, moderate (4-7)


   Last Admin: 05/19/19 20:25 Dose:  325 mg


Albuterol/Ipratropium (Duoneb 3 Mg/0.5 Mg (3 Ml) Ud)  3 ml INH RQ4 PRN


   PRN Reason: Shortness of Breath


   Last Admin: 05/18/19 21:05 Dose:  3 ml


Amlodipine Besylate (Norvasc)  10 mg PO DAILY [facility]


   Last Admin: 05/28/19 10:16 Dose:  10 mg


Divalproex Sodium (Depakote Sprinkles)  125 mg PO TID [facility]


   Last Admin: 05/28/19 10:16 Dose:  125 mg


Enoxaparin Sodium (Lovenox)  80 mg SC Q12 [facility]; Protocol


   Last Admin: 05/28/19 10:18 Dose:  80 mg


Hydrochlorothiazide (Hydrodiuril)  25 mg PO DAILY [facility]


   Last Admin: 05/28/19 10:17 Dose:  25 mg


Amikacin Sulfate 250 mg/ (Sodium Chloride)  101 mls @ 100.609 mls/hr IVPB Q24H 

[facility]; Protocol


   Last Admin: 05/28/19 10:25 Dose:  100.609 mls/hr


Clindamycin Phosphate (Cleocin 600mg/50ml D5w)  600 mg in 50 mls @ 50 mls/hr 

IVPB Q12H [facility]; Protocol


   Last Admin: 05/28/19 02:21 Dose:  50 mls/hr


Aztreonam 1 gm/ Sodium (Chloride)  100 mls @ 100 mls/hr IVPB Q12H [facility]; Protocol


   Last Admin: 05/28/19 02:20 Dose:  100 mls/hr


Metoprolol Tartrate (Lopressor)  100 mg PO BID@0900,2100 [facility]


   Last Admin: 05/28/19 10:17 Dose:  100 mg


Risperidone (Risperdal Tab)  0.25 mg PO BID PRN


   PRN Reason: Agitation


   Last Admin: 05/28/19 02:21 Dose:  0.25 mg











- Labs


Labs: 


                                        





                                 05/23/19 05:15 





                                 05/23/19 05:15 





                                        











PT  11.9 Seconds (9.8-13.1)   05/20/19  10:30    


 


INR  1.0   05/20/19  10:30    














Assessment and Plan


(1) Atrial fibrillation with RVR


Status: Acute   





(2) Pneumonia


Status: Acute   





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


Status: Acute   





(4) Old myocardial infarction


Status: Acute

## 2019-05-29 LAB
BUN SERPL-MCNC: 25 MG/DL (ref 7–17)
CALCIUM SERPL-MCNC: 9.1 MG/DL (ref 8.4–10.2)
GFR NON-AFRICAN AMERICAN: > 60

## 2019-05-29 RX ADMIN — ENOXAPARIN SODIUM SCH MG: 80 INJECTION SUBCUTANEOUS at 08:55

## 2019-05-29 RX ADMIN — DIVALPROEX SODIUM SCH MG: 125 CAPSULE ORAL at 17:10

## 2019-05-29 RX ADMIN — ENOXAPARIN SODIUM SCH MG: 80 INJECTION SUBCUTANEOUS at 20:40

## 2019-05-29 RX ADMIN — DIVALPROEX SODIUM SCH MG: 125 CAPSULE ORAL at 08:54

## 2019-05-29 RX ADMIN — DIVALPROEX SODIUM SCH MG: 125 CAPSULE ORAL at 12:31

## 2019-05-29 RX ADMIN — CLINDAMYCIN IN 5 PERCENT DEXTROSE SCH MLS/HR: 12 INJECTION, SOLUTION INTRAVENOUS at 01:14

## 2019-05-29 RX ADMIN — CLINDAMYCIN IN 5 PERCENT DEXTROSE SCH MLS/HR: 12 INJECTION, SOLUTION INTRAVENOUS at 12:38

## 2019-05-30 RX ADMIN — DIVALPROEX SODIUM SCH MG: 125 CAPSULE ORAL at 12:00

## 2019-05-30 RX ADMIN — ENOXAPARIN SODIUM SCH MG: 80 INJECTION SUBCUTANEOUS at 08:42

## 2019-05-30 RX ADMIN — DIVALPROEX SODIUM SCH MG: 125 CAPSULE ORAL at 08:40

## 2019-05-30 RX ADMIN — ENOXAPARIN SODIUM SCH MG: 80 INJECTION SUBCUTANEOUS at 21:35

## 2019-05-30 RX ADMIN — DIVALPROEX SODIUM SCH MG: 125 CAPSULE ORAL at 17:00

## 2019-05-30 RX ADMIN — CLINDAMYCIN IN 5 PERCENT DEXTROSE SCH MLS/HR: 12 INJECTION, SOLUTION INTRAVENOUS at 02:04

## 2019-05-30 RX ADMIN — CLINDAMYCIN IN 5 PERCENT DEXTROSE SCH MLS/HR: 12 INJECTION, SOLUTION INTRAVENOUS at 12:29

## 2019-05-30 NOTE — CP.PCM.PN
Subjective





- Date & Time of Evaluation


Date of Evaluation: 05/30/19


Time of Evaluation: 10:00





- Subjective


Subjective: 





pt remains mildly confused


pt needs PT


but pt cannot go to TCU while she is on 1:1


pt lives 5 flights up in her building.. no elevator


at this point she cannot walk up 1 flight





will transfer to [facility]-surg





Objective





- Vital Signs/Intake and Output


Vital Signs (last 24 hours): 


                                        











Temp Pulse Resp BP Pulse Ox


 


 98.2 F   74   20   109/62   96 


 


 05/30/19 08:19  05/30/19 08:41  05/30/19 08:19  05/30/19 08:41  05/30/19 08:19











- Medications


Medications: 


                               Current Medications





Acetaminophen (Tylenol 325mg Tab)  325 mg PO Q6 PRN


   PRN Reason: Pain, moderate (4-7)


   Last Admin: 05/19/19 20:25 Dose:  325 mg


Albuterol/Ipratropium (Duoneb 3 Mg/0.5 Mg (3 Ml) Ud)  3 ml INH RQ4 PRN


   PRN Reason: Shortness of Breath


   Last Admin: 05/18/19 21:05 Dose:  3 ml


Amlodipine Besylate (Norvasc)  10 mg PO DAILY [facility]


   Last Admin: 05/30/19 08:40 Dose:  10 mg


Divalproex Sodium (Depakote Sprinkles)  125 mg PO TID [facility]


   Last Admin: 05/30/19 08:40 Dose:  125 mg


Enoxaparin Sodium (Lovenox)  80 mg SC Q12 [facility]; Protocol


   Last Admin: 05/30/19 08:42 Dose:  80 mg


Hydrochlorothiazide (Hydrodiuril)  25 mg PO DAILY [facility]


   Last Admin: 05/30/19 08:41 Dose:  25 mg


Amikacin Sulfate 250 mg/ (Sodium Chloride)  101 mls @ 100.609 mls/hr IVPB Q24H 

[facility]; Protocol


   Last Admin: 05/29/19 12:30 Dose:  100.609 mls/hr


Clindamycin Phosphate (Cleocin 600mg/50ml D5w)  600 mg in 50 mls @ 50 mls/hr 

IVPB Q12H [facility]; Protocol


   Last Admin: 05/30/19 02:04 Dose:  50 mls/hr


Aztreonam 1 gm/ Sodium (Chloride)  100 mls @ 100 mls/hr IVPB Q12H [facility]; Protocol


   Last Admin: 05/30/19 02:04 Dose:  100 mls/hr


Metoprolol Tartrate (Lopressor)  100 mg PO BID@0900,2100 [facility]


   Last Admin: 05/30/19 08:41 Dose:  100 mg


Risperidone (Risperdal Tab)  0.25 mg PO BID PRN


   PRN Reason: Agitation


   Last Admin: 05/29/19 12:31 Dose:  0.25 mg











- Labs


Labs: 


                                        





                                 05/23/19 05:15 





                                 05/29/19 14:37 





                                        











PT  11.9 Seconds (9.8-13.1)   05/20/19  10:30    


 


INR  1.0   05/20/19  10:30    














Assessment and Plan


(1) Atrial fibrillation with RVR


Status: Acute   





(2) Pneumonia


Status: Acute   





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


Status: Acute   





(4) Old myocardial infarction


Status: Acute

## 2019-05-31 LAB
ALBUMIN SERPL-MCNC: 3.8 G/DL (ref 3.5–5)
ALBUMIN/GLOB SERPL: 1.1 {RATIO} (ref 1–2.1)
ALT SERPL-CCNC: 37 U/L (ref 9–52)
AST SERPL-CCNC: 35 U/L (ref 14–36)
BASOPHILS # BLD AUTO: 0.1 K/UL (ref 0–0.2)
BASOPHILS NFR BLD: 1.3 % (ref 0–2)
BUN SERPL-MCNC: 31 MG/DL (ref 7–17)
CALCIUM SERPL-MCNC: 9.2 MG/DL (ref 8.4–10.2)
EOSINOPHIL # BLD AUTO: 0.3 K/UL (ref 0–0.7)
EOSINOPHIL NFR BLD: 3.1 % (ref 0–4)
ERYTHROCYTE [DISTWIDTH] IN BLOOD BY AUTOMATED COUNT: 14.6 % (ref 11.5–14.5)
GFR NON-AFRICAN AMERICAN: 59
HGB BLD-MCNC: 14.8 G/DL (ref 12–16)
LYMPHOCYTES # BLD AUTO: 1.7 K/UL (ref 1–4.3)
LYMPHOCYTES NFR BLD AUTO: 19.9 % (ref 20–40)
MCH RBC QN AUTO: 28.4 PG (ref 27–31)
MCHC RBC AUTO-ENTMCNC: 32.3 G/DL (ref 33–37)
MCV RBC AUTO: 88 FL (ref 81–99)
MONOCYTES # BLD: 0.7 K/UL (ref 0–0.8)
MONOCYTES NFR BLD: 8.3 % (ref 0–10)
NEUTROPHILS # BLD: 5.8 K/UL (ref 1.8–7)
NEUTROPHILS NFR BLD AUTO: 67.4 % (ref 50–75)
NRBC BLD AUTO-RTO: 0 % (ref 0–0)
PLATELET # BLD: 225 K/UL (ref 130–400)
PMV BLD AUTO: 9 FL (ref 7.2–11.7)
RBC # BLD AUTO: 5.21 MIL/UL (ref 3.8–5.2)
WBC # BLD AUTO: 8.7 K/UL (ref 4.8–10.8)

## 2019-05-31 RX ADMIN — CLINDAMYCIN IN 5 PERCENT DEXTROSE SCH MLS/HR: 12 INJECTION, SOLUTION INTRAVENOUS at 01:12

## 2019-05-31 RX ADMIN — DIVALPROEX SODIUM SCH MG: 125 CAPSULE ORAL at 08:23

## 2019-05-31 RX ADMIN — DIVALPROEX SODIUM SCH MG: 125 CAPSULE ORAL at 14:26

## 2019-05-31 RX ADMIN — CLINDAMYCIN IN 5 PERCENT DEXTROSE SCH MLS/HR: 12 INJECTION, SOLUTION INTRAVENOUS at 14:25

## 2019-05-31 RX ADMIN — ENOXAPARIN SODIUM SCH MG: 80 INJECTION SUBCUTANEOUS at 21:33

## 2019-05-31 RX ADMIN — ENOXAPARIN SODIUM SCH MG: 80 INJECTION SUBCUTANEOUS at 08:22

## 2019-05-31 RX ADMIN — DIVALPROEX SODIUM SCH MG: 125 CAPSULE ORAL at 17:18

## 2019-05-31 NOTE — CARD
--------------- APPROVED REPORT --------------





Date of service: 05/31/2019



EKG Measurement

Heart [id number]

[id number]

[id number]

[id number]



<Conclusion>

Atrial fibrillation

Left axis deviation

Voltage criteria for left ventricular hypertrophy

Nonspecific ST and T wave abnormality

Abnormal ECG

## 2019-05-31 NOTE — PCM.RRT
<Bautista Santana - Last Filed: 05/31/19 02:00>





RRT Nurse Assessment





- Situation


RRT Responder Arrival Time: 01:32


RRT Reason for Call: Chest Pain


RRT Called By: RN





- IV


IV Inserted during RRT?: Yes


New IV Insertion Tolerance:: Fair





- Respiratory


Oxygen Delivery Method: Nasal Cannula





I.Reason for RRT





- A) Acute Change in Patient:


(Select all that apply): Chest Pain


Subjective: 


86 year old female with medical history of dementia, COPD, HTN, and Afib ad

mitted for pneumonia and A-Fib with RVR. RRT called for chest pain. RRT team 

arrived on site with Dr. Vick. Chest pain is substernal, pressure like, 7/10

nonradiating w/o SOB. Initial vitals: 142/87, O2 92%, HR 89. Patient started on 

NC 2L. Physical exam: Irregularly irregular HR, S1S2 present, CTAB, AAO x 3. 

Nitroglycerin SL stat given with improvement in pain to 5/10. EKG stat showed A 

fib without any acute ST changes. 


- CBC, CMP, Troponins x 3 


- EKG: A fib, No ST elevation


-  mg 


- NTG SL stat


- Protonix 40 IV


- Protonix 40 po daily starting from tomorrow


- Patient to be transferred to Telemetry for further monitoring.


- F/U Labs





Case discussed with Dr. Vick








- Neurological Status


(Select all that apply): Alert, Responsive, Oriented, Verbal, Follows Commands





- Respiratory


Oxygen Delivery Method: Nasal Cannula @L/min


Oxygen Flow Rate: 2





- Respiratory Exam


Respiratory Exam: Clear to Ausculation Bilateral





- Cardiovascular Exam


Cardiovascular Exam: Irregular Rhythm, +S1, +S2.  absent: Bradycardia, 

Tachycardia, Murmur





- Neurological Exam


Neurological Exam: Alert, Awake, Oriented x3





Plan





- Assessment of Findings&Treatment Plan


86 year old female with medical history of dementia, COPD, HTN, and Afib 

admitted for pneumonia and A-Fib with RVR. RRT called for chest pain.





- S/P NTG,  mg, Protonix 40 mg IV stat


- EKG: A fib, No ST elevation


- CBC, CMP, Troponins x 3 


- Protonix 40 po daily starting from tomorrow


- Patient to be transferred to Telemetry for further monitoring.


- F/U Labs





Case discussed with Dr. Vick








<Xander Vick M - Last Filed: 05/31/19 02:51>





RRT Nurse Assessment





- Vital Signs


Vital Signs: 


Rapid Response Vital Sign





Blood Pressure                   146/89


Pulse Rate                       89


Respiratory Rate                 24


Temperature                      97.5 F


Oxygen Saturation                99











- Vital Signs at end of RRT


Vital Signs at end of RRT: 


Rapid Response End Vital Sign





Blood Pressure                   85/58


Pulse Rate                       86


Respiratory Rate                 22


Temperature                      97.5 F


O2 Sat by Pulse Oximetry         98











Plan





- Assessment of Findings&Treatment Plan





History as documented by resident was reviewed with patient and resident. I 

performed the key elements of exam and agree with the above findings. 

Diagnostics were reviewed and medical decision making and plan of care performed

by me. 85 yo CF with hx of dementia, HTN, chronic A-fib and COPD being treated 

for E-coli bacteremia in the context of LLL pneumonia as well as A-Fib RVR for 

the past 2 weeks at this center. RRT was called as she c/o retrosternal pressure

like 7/10 non-radiating CP. Hemodynamically stable. Denies SOB and saturating 

97% on O2 via NC. On exam S1/S2 irregularly irregular. Lungs clear b/l. Mild 

epigastric tenderness present. Will order EKG and will trend troponin. Will 

transfer to tele. TNG and Morphine as needed for pain. Will give PPI for 

possible GI source of symptoms. I spent total of 32 min of critical care time 

trying to stabilize this critically unstable patient excluding any time spent 

for any separately billable procedure.

## 2019-05-31 NOTE — CP.PCM.CON
History of Present Illness





- History of Present Illness


History of Present Illness: 





Neurology Consultation Note: Consult requested by Dr. Guevara





Mrs. Natarajan is an 86-year-old woman with a past medical history of advanced 

dementia, COPD, HTN, and atrial fibrillation (not on anticoagulation), who was 

brought to the ED by family for chills, chest pain and infection.  She was on 

antibiotics when she presented and is currently being treated by ID for 

pneumonia and COPD.  Neurology was consulted for progressive dementia. 





Review of Systems





- Review of Systems


Systems not reviewed;Unavailable: Altered Mental Status





Past Patient History





- Past Medical History & Family History


Past Medical History?: Yes





- Past Social History


Smoking Status: Never Smoked





- CARDIAC


Hx Cardiac Disorders: Yes





- PULMONARY


Hx Bronchitis: Yes (Chronic)


Hx Chronic Obstructive Pulmonary Disease (COPD): Yes


Hx Pneumonia: Yes





- NEUROLOGICAL


Hx Neurological Disorder: Yes


Hx Dizziness: Yes (LIGHTHEADEDNESS)





- HEENT


Hx HEENT Problems: Yes


Hx Cataracts: Yes





- RENAL


Hx Chronic Kidney Disease: Yes





- ENDOCRINE/METABOLIC


Hx Endocrine Disorders: No





- HEMATOLOGICAL/ONCOLOGICAL


Hx AIDS: No


Hx Human Immunodeficiency Virus (HIV): No





- MUSCULOSKELETAL/RHEUMATOLOGICAL


Hx Falls: No





- GASTROINTESTINAL


Hx Gall Bladder Disease: Yes





- GENITOURINARY/GYNECOLOGICAL


Hx Genitourinary Disorders: Yes


Hx Hematuria: Yes





- PSYCHIATRIC


Hx Substance Use: No





- SURGICAL HISTORY


Hx Surgeries: Yes


Hx Cataract Extraction: Yes


Other/Comment: Ovarian cyst





- ANESTHESIA


Hx Anesthesia: Yes


Hx Anesthesia Reactions: No





Meds


Allergies/Adverse Reactions: 


                                    Allergies











Allergy/AdvReac Type Severity Reaction Status Date / Time


 


Penicillins Allergy  RASH Verified 05/18/19 09:44














- Medications


Medications: 


                               Current Medications





Acetaminophen (Tylenol 325mg Tab)  325 mg PO Q6 PRN


   PRN Reason: Pain, moderate (4-7)


   Last Admin: 05/19/19 20:25 Dose:  325 mg


Albuterol/Ipratropium (Duoneb 3 Mg/0.5 Mg (3 Ml) Ud)  3 ml INH RQ4 PRN


   PRN Reason: Shortness of Breath


   Last Admin: 05/18/19 21:05 Dose:  3 ml


Amlodipine Besylate (Norvasc)  10 mg PO DAILY [facility]


   Last Admin: 05/31/19 08:24 Dose:  10 mg


Divalproex Sodium (Depakote Sprinkles)  125 mg PO TID [facility]


   Last Admin: 05/31/19 08:23 Dose:  125 mg


Enoxaparin Sodium (Lovenox)  80 mg SC Q12 [facility]; Protocol


   Last Admin: 05/31/19 08:22 Dose:  80 mg


Hydrochlorothiazide (Hydrodiuril)  25 mg PO DAILY [facility]


   Last Admin: 05/31/19 08:22 Dose:  25 mg


Amikacin Sulfate 250 mg/ (Sodium Chloride)  101 mls @ 100.609 mls/hr IVPB Q24H 

[facility]; Protocol


   Last Admin: 05/31/19 12:37 Dose:  100.609 mls/hr


Clindamycin Phosphate (Cleocin 600mg/50ml D5w)  600 mg in 50 mls @ 50 mls/hr 

IVPB Q12H [facility]; Protocol


   Last Admin: 05/31/19 01:12 Dose:  50 mls/hr


Aztreonam 1 gm/ Sodium (Chloride)  100 mls @ 100 mls/hr IVPB Q12H [facility]; Protocol


   Last Admin: 05/31/19 02:41 Dose:  100 mls/hr


Metoprolol Tartrate (Lopressor)  100 mg PO BID@0900,2100 [facility]


   Last Admin: 05/31/19 08:23 Dose:  100 mg


Risperidone (Risperdal Tab)  0.25 mg PO BID PRN


   PRN Reason: Agitation


   Last Admin: 05/29/19 12:31 Dose:  0.25 mg











Physical Exam





- Constitutional


Appears: Well





- Head Exam


Head Exam: ATRAUMATIC, NORMAL INSPECTION, NORMOCEPHALIC





- Eye Exam


Eye Exam: EOMI, Normal appearance, PERRL


Pupil Exam: NORMAL ACCOMODATION, PERRL





- ENT Exam


ENT Exam: Mucous Membranes Moist, Normal Exam





- Neck Exam


Neck exam: Positive for: Normal Inspection





- Respiratory Exam


Respiratory Exam: Decreased Breath Sounds, Wheezes





- Cardiovascular Exam


Cardiovascular Exam: REGULAR RHYTHM, +S1, +S2





- GI/Abdominal Exam


GI & Abdominal Exam: Normal Bowel Sounds, Soft.  absent: Tenderness





-  Exam


Bimanual exam: NORMAL BIMANUAL EXAM





- Back Exam


Back exam: NORMAL INSPECTION





- Neurological Exam


Neurological exam: Abnormal Gait, Alert, Altered, CN II-XII Intact, Reflexes 

Normal





- Psychiatric Exam


Psychiatric exam: Normal Affect, Normal Mood





- Skin


Skin Exam: Dry, Intact, Normal Color, Warm





Results





- Vital Signs


Recent Vital Signs: 


                                Last Vital Signs











Temp  97.6 F   05/31/19 11:58


 


Pulse  78   05/31/19 11:58


 


Resp  20   05/31/19 11:58


 


BP  109/72   05/31/19 11:58


 


Pulse Ox  98   05/31/19 11:58














- Labs


Result Diagrams: 


                                 05/31/19 01:58





                                 05/31/19 01:58


Labs: 


                         Laboratory Results - last 24 hr











  05/31/19 05/31/19 05/31/19





  01:37 01:58 01:58


 


WBC   8.7 


 


RBC   5.21 H 


 


Hgb   14.8 


 


Hct   45.8 


 


MCV   88.0 


 


MCH   28.4 


 


MCHC   32.3 L 


 


RDW   14.6 H 


 


Plt Count   225 


 


MPV   9.0 


 


Neut % (Auto)   67.4 


 


Lymph % (Auto)   19.9 L 


 


Mono % (Auto)   8.3 


 


Eos % (Auto)   3.1 


 


Baso % (Auto)   1.3 


 


Neut # (Auto)   5.8 


 


Lymph # (Auto)   1.7 


 


Mono # (Auto)   0.7 


 


Eos # (Auto)   0.3 


 


Baso # (Auto)   0.1 


 


Sodium    140


 


Potassium    4.0


 


Chloride    101


 


Carbon Dioxide    31 H


 


Anion Gap    12


 


BUN    31 H


 


Creatinine    0.9


 


Est GFR ( Amer)    > 60


 


Est GFR (Non-Af Amer)    59


 


POC Glucose (mg/dL)  104  


 


Random Glucose    104


 


Calcium    9.2


 


Total Bilirubin    0.4


 


AST    35


 


ALT    37


 


Alkaline Phosphatase    68


 


Troponin I    0.0200


 


Total Protein    7.2


 


Albumin    3.8


 


Globulin    3.4


 


Albumin/Globulin Ratio    1.1














  05/31/19 05/31/19 05/31/19





  06:54 07:40 10:44


 


WBC   


 


RBC   


 


Hgb   


 


Hct   


 


MCV   


 


MCH   


 


MCHC   


 


RDW   


 


Plt Count   


 


MPV   


 


Neut % (Auto)   


 


Lymph % (Auto)   


 


Mono % (Auto)   


 


Eos % (Auto)   


 


Baso % (Auto)   


 


Neut # (Auto)   


 


Lymph # (Auto)   


 


Mono # (Auto)   


 


Eos # (Auto)   


 


Baso # (Auto)   


 


Sodium   


 


Potassium   


 


Chloride   


 


Carbon Dioxide   


 


Anion Gap   


 


BUN   


 


Creatinine   


 


Est GFR ( Amer)   


 


Est GFR (Non-Af Amer)   


 


POC Glucose (mg/dL)  101   270 H


 


Random Glucose   


 


Calcium   


 


Total Bilirubin   


 


AST   


 


ALT   


 


Alkaline Phosphatase   


 


Troponin I   0.0150 


 


Total Protein   


 


Albumin   


 


Globulin   


 


Albumin/Globulin Ratio   














Assessment & Plan


(1) Toxic metabolic encephalopathy


Assessment and Plan: 


Non-contrast CT scan of the head showed some chronic ischemic changes and the 

patient has baseline dementia.  It is possible that the current pneumonia has 

caused a superimposed toxic-metabolic encephalopathy to cause decompensation.  I

recommend continuing treatment of underlying medical conditions.





Thank you for this consultation. 


Status: Acute

## 2019-06-01 RX ADMIN — DIVALPROEX SODIUM SCH MG: 125 CAPSULE ORAL at 09:21

## 2019-06-01 RX ADMIN — ENOXAPARIN SODIUM SCH MG: 80 INJECTION SUBCUTANEOUS at 09:22

## 2019-06-01 RX ADMIN — ENOXAPARIN SODIUM SCH MG: 80 INJECTION SUBCUTANEOUS at 20:49

## 2019-06-01 RX ADMIN — CLINDAMYCIN IN 5 PERCENT DEXTROSE SCH MLS/HR: 12 INJECTION, SOLUTION INTRAVENOUS at 02:12

## 2019-06-01 RX ADMIN — CLINDAMYCIN IN 5 PERCENT DEXTROSE SCH MLS/HR: 12 INJECTION, SOLUTION INTRAVENOUS at 15:20

## 2019-06-01 RX ADMIN — DIVALPROEX SODIUM SCH MG: 125 CAPSULE ORAL at 16:31

## 2019-06-01 RX ADMIN — DIVALPROEX SODIUM SCH MG: 125 CAPSULE ORAL at 13:21

## 2019-06-01 NOTE — CP.PCM.PN
Subjective





- Date & Time of Evaluation


Date of Evaluation: 06/01/19


Time of Evaluation: 11:20





- Subjective


Subjective: 





                    Appears comfortably resting in bed and answers questions 

appropriately.


The nurses also report less confusion.


The patient has received intravenous antibiotics to manage her pneumonia.


The patient appears hemodynamically stable.


Will be sent off telemetry floor.





Objective





- Vital Signs/Intake and Output


Vital Signs (last 24 hours): 


                                        











Temp Pulse Resp BP Pulse Ox


 


 97.6 F   69   18   125/81   98 


 


 06/01/19 08:09  06/01/19 09:22  06/01/19 08:09  06/01/19 09:22  06/01/19 08:09











- Medications


Medications: 


                               Current Medications





Acetaminophen (Tylenol 325mg Tab)  325 mg PO Q6 PRN


   PRN Reason: Pain, moderate (4-7)


   Last Admin: 05/19/19 20:25 Dose:  325 mg


Albuterol/Ipratropium (Duoneb 3 Mg/0.5 Mg (3 Ml) Ud)  3 ml INH RQ4 PRN


   PRN Reason: Shortness of Breath


   Last Admin: 05/18/19 21:05 Dose:  3 ml


Amlodipine Besylate (Norvasc)  10 mg PO DAILY [facility]


   Last Admin: 06/01/19 09:22 Dose:  10 mg


Divalproex Sodium (Depakote Sprinkles)  125 mg PO TID [facility]


   Last Admin: 06/01/19 09:21 Dose:  125 mg


Enoxaparin Sodium (Lovenox)  80 mg SC Q12 [facility]; Protocol


   Last Admin: 06/01/19 09:22 Dose:  80 mg


Hydrochlorothiazide (Hydrodiuril)  25 mg PO DAILY [facility]


   Last Admin: 06/01/19 09:20 Dose:  25 mg


Amikacin Sulfate 250 mg/ (Sodium Chloride)  101 mls @ 100.609 mls/hr IVPB Q24H 

[facility]; Protocol


   Last Admin: 05/31/19 12:37 Dose:  100.609 mls/hr


Clindamycin Phosphate (Cleocin 600mg/50ml D5w)  600 mg in 50 mls @ 50 mls/hr 

IVPB Q12H [facility]; Protocol


   Last Admin: 06/01/19 02:12 Dose:  50 mls/hr


Aztreonam 1 gm/ Sodium (Chloride)  100 mls @ 100 mls/hr IVPB Q12H [facility]; Protocol


   Last Admin: 06/01/19 00:45 Dose:  100 mls/hr


Metoprolol Tartrate (Lopressor)  100 mg PO BID@0900,2100 [facility]


   Last Admin: 06/01/19 09:20 Dose:  100 mg


Risperidone (Risperdal Tab)  0.25 mg PO BID PRN


   PRN Reason: Agitation


   Last Admin: 05/29/19 12:31 Dose:  0.25 mg











- Labs


Labs: 


                                        





                                 05/31/19 01:58 





                                 05/31/19 01:58 





                                        











PT  11.9 Seconds (9.8-13.1)   05/20/19  10:30    


 


INR  1.0   05/20/19  10:30

## 2019-06-02 RX ADMIN — CLINDAMYCIN IN 5 PERCENT DEXTROSE SCH MLS/HR: 12 INJECTION, SOLUTION INTRAVENOUS at 15:00

## 2019-06-02 RX ADMIN — DIVALPROEX SODIUM SCH MG: 125 CAPSULE ORAL at 09:21

## 2019-06-02 RX ADMIN — ENOXAPARIN SODIUM SCH MG: 80 INJECTION SUBCUTANEOUS at 20:27

## 2019-06-02 RX ADMIN — ENOXAPARIN SODIUM SCH MG: 80 INJECTION SUBCUTANEOUS at 09:22

## 2019-06-02 RX ADMIN — DIVALPROEX SODIUM SCH MG: 125 CAPSULE ORAL at 17:01

## 2019-06-02 RX ADMIN — DIVALPROEX SODIUM SCH MG: 125 CAPSULE ORAL at 14:10

## 2019-06-02 RX ADMIN — CLINDAMYCIN IN 5 PERCENT DEXTROSE SCH MLS/HR: 12 INJECTION, SOLUTION INTRAVENOUS at 02:27

## 2019-06-02 NOTE — CP.PCM.PN
Subjective





- Date & Time of Evaluation


Date of Evaluation: 06/02/19


Time of Evaluation: 10:00





- Subjective


Subjective: 





                The patient denies any shortness of breath or significant 

coughing spells.


Telemetry shows stable rhythm with physiological heart rates.


Blood pressure was 130/74 mmHg.


There was no evidence of congestive cardiac failure.


Labs from couple of days back show stable BUN/creatinine and electrolytes.


There was no leukocytosis.


The patient is able to walk around the room using her walker I have promised her

that I will let her return home tomorrow.





Objective





- Vital Signs/Intake and Output


Vital Signs (last 24 hours): 


                                        











Temp Pulse Resp BP Pulse Ox


 


 98.1 F   83   20   108/70   95 


 


 06/02/19 08:10  06/02/19 09:22  06/02/19 08:10  06/02/19 09:22  06/02/19 08:10











- Medications


Medications: 


                               Current Medications





Acetaminophen (Tylenol 325mg Tab)  325 mg PO Q6 PRN


   PRN Reason: Pain, moderate (4-7)


   Last Admin: 05/19/19 20:25 Dose:  325 mg


Albuterol/Ipratropium (Duoneb 3 Mg/0.5 Mg (3 Ml) Ud)  3 ml INH RQ4 PRN


   PRN Reason: Shortness of Breath


   Last Admin: 05/18/19 21:05 Dose:  3 ml


Amlodipine Besylate (Norvasc)  10 mg PO DAILY [facility]


   Last Admin: 06/02/19 09:22 Dose:  10 mg


Divalproex Sodium (Depakote Sprinkles)  125 mg PO TID [facility]


   Last Admin: 06/02/19 09:21 Dose:  125 mg


Enoxaparin Sodium (Lovenox)  80 mg SC Q12 [facility]; Protocol


   Last Admin: 06/02/19 09:22 Dose:  80 mg


Hydrochlorothiazide (Hydrodiuril)  25 mg PO DAILY [facility]


   Last Admin: 06/02/19 09:21 Dose:  25 mg


Amikacin Sulfate 250 mg/ (Sodium Chloride)  101 mls @ 100.609 mls/hr IVPB Q24H 

[facility]; Protocol


   Last Admin: 06/01/19 13:20 Dose:  100.609 mls/hr


Clindamycin Phosphate (Cleocin 600mg/50ml D5w)  600 mg in 50 mls @ 50 mls/hr 

IVPB Q12H [facility]; Protocol


   Last Admin: 06/02/19 02:27 Dose:  50 mls/hr


Aztreonam 1 gm/ Sodium (Chloride)  100 mls @ 100 mls/hr IVPB Q12H [facility]; Protocol


   Last Admin: 06/02/19 00:54 Dose:  100 mls/hr


Metoprolol Tartrate (Lopressor)  100 mg PO BID@0900,2100 [facility]


   Last Admin: 06/02/19 09:21 Dose:  100 mg


Risperidone (Risperdal Tab)  0.25 mg PO BID PRN


   PRN Reason: Agitation


   Last Admin: 05/29/19 12:31 Dose:  0.25 mg











- Labs


Labs: 


                                        





                                 05/31/19 01:58 





                                 05/31/19 01:58 





                                        











PT  11.9 Seconds (9.8-13.1)   05/20/19  10:30    


 


INR  1.0   05/20/19  10:30

## 2019-06-03 VITALS — OXYGEN SATURATION: 96 %

## 2019-06-03 VITALS — SYSTOLIC BLOOD PRESSURE: 129 MMHG | HEART RATE: 81 BPM | DIASTOLIC BLOOD PRESSURE: 86 MMHG | TEMPERATURE: 98 F

## 2019-06-03 VITALS — RESPIRATION RATE: 20 BRPM

## 2019-06-03 RX ADMIN — DIVALPROEX SODIUM SCH MG: 125 CAPSULE ORAL at 09:22

## 2019-06-03 RX ADMIN — ENOXAPARIN SODIUM SCH MG: 80 INJECTION SUBCUTANEOUS at 09:23

## 2019-06-03 NOTE — CP.PCM.PN
Subjective





- Date & Time of Evaluation


Date of Evaluation: 06/03/19


Time of Evaluation: 09:00





- Subjective


Subjective: 





                     The patient has been afebrile over the last 24 hours.


She appears alert awake and coherent sitting up in a chair having eaten her 

breakfast.


The nurses report that with a walker she is able to ambulate to the bathroom and

back with virtually no assistance.


The telemetry continues to show atrial fibrillation at moderate heart rates.


Her blood pressure was 146/74 mmHg.


There was no evidence of congestive cardiac failure.


I have discussed her antibiotics with the infectious disease specialist and he 

recommends that it be discontinued at this point.


The patient is being sent home on her current medications.


The issue of chronic anticoagulation will be left for her cardiologist to 

address when she returns to see him in his office.





Objective





- Vital Signs/Intake and Output


Vital Signs (last 24 hours): 


                                        











Temp Pulse Resp BP Pulse Ox


 


 98.0 F   81   20   129/86   96 


 


 06/03/19 08:03  06/03/19 09:23  06/03/19 08:03  06/03/19 09:23  06/03/19 08:03











- Medications


Medications: 


                               Current Medications





Albuterol/Ipratropium (Duoneb 3 Mg/0.5 Mg (3 Ml) Ud)  3 ml INH RQ4 PRN


   PRN Reason: Shortness of Breath


   Last Admin: 05/18/19 21:05 Dose:  3 ml


Amlodipine Besylate (Norvasc)  10 mg PO DAILY [facility]


   Last Admin: 06/03/19 09:23 Dose:  10 mg


Divalproex Sodium (Depakote Sprinkles)  125 mg PO TID [facility]


   Last Admin: 06/03/19 09:22 Dose:  125 mg


Enoxaparin Sodium (Lovenox)  80 mg SC Q12 [facility]; Protocol


   Last Admin: 06/03/19 09:23 Dose:  80 mg


Hydrochlorothiazide (Hydrodiuril)  25 mg PO DAILY [facility]


   Last Admin: 06/03/19 09:22 Dose:  25 mg


Metoprolol Tartrate (Lopressor)  100 mg PO BID@0900,2100 [facility]


   Last Admin: 06/03/19 09:23 Dose:  100 mg


Risperidone (Risperdal Tab)  0.25 mg PO BID PRN


   PRN Reason: Agitation


   Last Admin: 05/29/19 12:31 Dose:  0.25 mg











- Labs


Labs: 


                                        





                                 05/31/19 01:58 





                                 05/31/19 01:58 





                                        











PT  11.9 Seconds (9.8-13.1)   05/20/19  10:30    


 


INR  1.0   05/20/19  10:30

## 2023-10-10 NOTE — CP.PCM.CON
History of Present Illness





- History of Present Illness


History of Present Illness: 





This is a 84 yrs old female who was brought to the ER with c/o right upper and 

lower quadrant pain. She has a past h/oCOPD, HTN,MI in '86, gastritis,several 

herniated discs and anxiety.  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 

hemophilia. She is normally asymptomatic, but after a surgery she has had 

excessive bleeding, and has needed factor VIII transfusions. 


Pt had a CT scan in the ER, which showed several gall stones  with inflammatory 

changes in the GB. She may need surgery and if she does, she will need to be 

given factor VIII before and after the surgery,She has not had a test to check 

the factor VIII level in 12 yrs. She has no evidence of blleeding from any site 

other than the rectum for 2 days.





Past Patient History





- Past Medical History & Family History


Past Medical History?: Yes





- Past Social History


Smoking Status: Former Smoker





- CARDIAC


Hx Cardiac Disorders: Yes


Hx Heart Attack: Yes (1986)


Hx Hypertension: Yes





- PULMONARY


Hx Chronic Obstructive Pulmonary Disease (COPD): Yes


Hx Pneumonia: Yes





- NEUROLOGICAL


Hx Neurological Disorder: Yes


Hx Dizziness: Yes (LIGHTHEADEDNESS)





- HEENT


Hx Cataracts: Yes





- RENAL


Hx Kidney Stones: Yes





- ENDOCRINE/METABOLIC


Hx Endocrine Disorders: No





- HEMATOLOGICAL/ONCOLOGICAL


Hx Hemophilia: Yes (Carrier)





- MUSCULOSKELETAL/RHEUMATOLOGICAL


Hx Falls: No





- GASTROINTESTINAL


Hx Gall Bladder Disease: Yes


Hx Hemorrhoids: Yes


Other/Comment: Rectal bleeding





- GENITOURINARY/GYNECOLOGICAL


Hx Hematuria: Yes





- PSYCHIATRIC


Hx Substance Use: No





- SURGICAL HISTORY


Hx Cataract Extraction: Yes


Other/Comment: Ovarian cyst





- ANESTHESIA


Hx Anesthesia: Yes


Hx Anesthesia Reactions: No





Meds


Allergies/Adverse Reactions: 


 Allergies











Allergy/AdvReac Type Severity Reaction Status Date / Time


 


Penicillins Allergy  RASH Verified 11/19/16 11:42














- Medications


Medications: 


 Current Medications





Alprazolam (Xanax)  0.5 mg PO TID PRN


   PRN Reason: Anxiety


Diltiazem HCl (Cardizem)  60 mg PO QID [facility]


   Last Admin: 07/11/17 12:52 Dose:  60 mg


Dorzolamide HCl (Trusopt)  1 drop OS BID [facility]


   Last Admin: 07/11/17 11:16 Dose:  1 units


Enoxaparin Sodium (Lovenox)  40 mg SC DAILY [facility]


   PRN Reason: Protocol


   Last Admin: 07/11/17 11:16 Dose:  40 mg


Latanoprost (Xalatan Opht)  1 drop OU HS [facility]


Timolol Maleate (Timoptic 0.25% Ophth Soln)  1 drop OS BID [facility]


   Last Admin: 07/11/17 12:54 Dose:  1 drop











Physical Exam





- Additional Findings


Additional findings: 





Alert, well oriented in no acute distress


neck; supple, no adenopy


Chest; Clear, no rales or rhonchi


Heart; RSR, no murmur


Abd; Soft. no mass, or h/s megaly.





Results





- Vital Signs


Recent Vital Signs: 


 Last Vital Signs











Temp  97.5 F L  07/11/17 12:04


 


Pulse  92 H  07/11/17 12:52


 


Resp  18   07/11/17 12:04


 


BP  137/72   07/11/17 12:52


 


Pulse Ox  99   07/11/17 12:04














- Labs


Result Diagrams: 


 07/11/17 05:00





 07/11/17 05:00


Labs: 


 Laboratory Results - last 24 hr











  07/10/17 07/11/17 07/11/17





  21:05 05:00 05:00


 


WBC   8.2 


 


RBC   5.13 


 


Hgb   14.5 


 


Hct   44.3 


 


MCV   86.4 


 


MCH   28.3 


 


MCHC   32.8 L 


 


RDW   13.9 


 


Plt Count   189 


 


MPV   9.0 


 


Neut % (Auto)   65.8 


 


Lymph % (Auto)   21.4 


 


Mono % (Auto)   7.5 


 


Eos % (Auto)   4.3 H 


 


Baso % (Auto)   1.0 


 


Neut #   5.4 


 


Lymph #   1.7 


 


Mono #   0.6 


 


Eos #   0.4 


 


Baso #   0.1 


 


Sodium    143


 


Potassium    3.8


 


Chloride    110 H


 


Carbon Dioxide    25


 


Anion Gap    12


 


BUN    16


 


Creatinine    0.8


 


Est GFR ( Amer)    > 60


 


Est GFR (Non-Af Amer)    > 60


 


Random Glucose    88


 


Calcium    9.1


 


Lipase  158  














Assessment & Plan





- Assessment and Plan (Free Text)


Assessment: 














Impression; Cholecystitis and cholelithiasis in a patient with h/o hemophilia 

carrier..


COPD, HTN, MI, CAD, Nephrolithiasis,gastritis and multiple disc herniations.


Plan: 





Plan Patient wants to take time to decide if she wants the surgery or not. If 

she does need surgeryu, please call me a day ahead so I can have Factor VIII 

available for her.





- Date & Time


Date: 07/11/17 (n)


Time: 14:38 Detail Level: Detailed Quality 110: Preventive Care And Screening: Influenza Immunization: Influenza Immunization not Administered because Patient Refused.